# Patient Record
Sex: MALE | Race: WHITE | NOT HISPANIC OR LATINO | Employment: FULL TIME | URBAN - METROPOLITAN AREA
[De-identification: names, ages, dates, MRNs, and addresses within clinical notes are randomized per-mention and may not be internally consistent; named-entity substitution may affect disease eponyms.]

---

## 2017-01-16 ENCOUNTER — OFFICE VISIT (OUTPATIENT)
Dept: FAMILY MEDICINE CLINIC | Facility: CLINIC | Age: 52
End: 2017-01-16

## 2017-01-16 VITALS
OXYGEN SATURATION: 96 % | HEART RATE: 71 BPM | BODY MASS INDEX: 26.98 KG/M2 | HEIGHT: 75 IN | TEMPERATURE: 97.9 F | DIASTOLIC BLOOD PRESSURE: 82 MMHG | SYSTOLIC BLOOD PRESSURE: 130 MMHG | WEIGHT: 217 LBS

## 2017-01-16 DIAGNOSIS — R05.9 COUGH: ICD-10-CM

## 2017-01-16 DIAGNOSIS — J06.9 ACUTE URI: ICD-10-CM

## 2017-01-16 DIAGNOSIS — J40 BRONCHITIS: Primary | ICD-10-CM

## 2017-01-16 PROCEDURE — 71020 XR CHEST PA AND LATERAL: CPT | Performed by: FAMILY MEDICINE

## 2017-01-16 PROCEDURE — 99214 OFFICE O/P EST MOD 30 MIN: CPT | Performed by: FAMILY MEDICINE

## 2017-01-16 RX ORDER — LEVOFLOXACIN 500 MG/1
500 TABLET, FILM COATED ORAL DAILY
Qty: 10 TABLET | Refills: 0 | Status: SHIPPED | OUTPATIENT
Start: 2017-01-16 | End: 2017-01-26

## 2017-01-16 NOTE — PROGRESS NOTES
Subjective   Edin Flores is a 51 y.o. male.     History of Present Illness   Edin Flores 51 y.o. male who presents for evaluation of sinus pressure and congestion, cough. Symptoms include congestion and dry cough.  Onset of symptoms was 2 months ago, unchanged since that time. Patient denies hemoptysis, high fever.   Evaluation to date: seen recently by me Treatment to date:  OTC oral decongestants, Mucinex and antibiotics.          The following portions of the patient's history were reviewed and updated as appropriate: allergies, current medications, past family history, past medical history, past social history, past surgical history and problem list.    Review of Systems   Constitutional: Positive for fatigue. Negative for activity change and unexpected weight change.   HENT: Positive for congestion, postnasal drip, rhinorrhea and sinus pressure. Negative for ear pain.         Hoarseness   Eyes: Negative for pain and discharge.   Respiratory: Positive for cough. Negative for chest tightness, shortness of breath and wheezing.    Cardiovascular: Negative for chest pain and palpitations.   Gastrointestinal: Negative for abdominal pain, diarrhea and vomiting.   Endocrine: Negative.    Genitourinary: Negative.    Musculoskeletal: Negative for joint swelling.   Skin: Negative for color change, rash and wound.   Allergic/Immunologic: Negative.    Neurological: Negative for seizures and syncope.   Psychiatric/Behavioral: Negative.        Objective   Physical Exam   Constitutional: He is oriented to person, place, and time. He appears well-developed and well-nourished.   HENT:   Head: Normocephalic and atraumatic.   Right Ear: External ear normal.   Left Ear: External ear normal.   Mouth/Throat: Oropharynx is clear and moist.   Eyes: EOM are normal. Pupils are equal, round, and reactive to light.   Neck: Normal range of motion. Neck supple. No thyromegaly present.   Cardiovascular: Normal rate, regular rhythm,  normal heart sounds and intact distal pulses.    Pulmonary/Chest: Effort normal. No respiratory distress. He has wheezes. He has no rales.   Wheezes and rhonchi left base   Abdominal: Soft. Bowel sounds are normal.   Musculoskeletal: Normal range of motion.   Lymphadenopathy:     He has no cervical adenopathy.   Neurological: He is alert and oriented to person, place, and time. He has normal reflexes.   Skin: Skin is warm and dry.   Psychiatric: He has a normal mood and affect. His behavior is normal.   Nursing note and vitals reviewed.  Views: PA/Lateral    Relevant Clinical Issues/Diagnoses/Indications for XRay: see HPI  Clinical Findings: Chest: was negative for infiltrate, effusion, pneumothorax, or wide mediastinum    Compared with previous XRay? no    Date of Previous Xray:n/a    Changes on current Xray? not applicable    Assessment/Plan   Edin was seen today for sinusitis and cough.    Diagnoses and all orders for this visit:    Bronchitis  -     XR Chest PA & Lateral    Cough  -     XR Chest PA & Lateral    Acute URI  -     XR Chest PA & Lateral    Other orders  -     levoFLOXacin (LEVAQUIN) 500 MG tablet; Take 1 tablet by mouth Daily for 10 days. For Respiratory infection

## 2017-01-16 NOTE — MR AVS SNAPSHOT
Edin Flores   1/16/2017 11:30 AM   Office Visit    Provider:  Chao Morton MD   Department:  [facility]   Dept Phone:  [phone]                Your Full Care Plan              Today's Medication Changes          These changes are accurate as of: 1/16/17 12:50 PM.  If you have any questions, ask your nurse or doctor.               New Medication(s)Ordered:     levoFLOXacin 500 MG tablet   Commonly known as:  LEVAQUIN   Take 1 tablet by mouth Daily for 10 days. For Respiratory infection   Started by:  Chao Morton MD         Stop taking medication(s)listed here:     cefuroxime 250 MG tablet   Commonly known as:  CEFTIN   Stopped by:  Chao Morton MD                Where to Get Your Medications      These medications were sent to [address] - [address] [place] [address] [phone] [place] [id number] [address] [address]     Phone:  [phone]     levoFLOXacin 500 MG tablet                  Your Updated Medication List          This list is accurate as of: 1/16/17 12:50 PM.  Always use your most recent med list.                levoFLOXacin 500 MG tablet   Commonly known as:  LEVAQUIN   Take 1 tablet by mouth Daily for 10 days. For Respiratory infection               We Performed the Following     XR Chest PA & Lateral       You Were Diagnosed With        Codes Comments    Bronchitis    -  Primary ICD-10-CM: J40  ICD-9-CM: 490     Cough     ICD-10-CM: R05  ICD-9-CM: 786.2     Acute URI     ICD-10-CM: J06.9  ICD-9-CM: 465.9       Instructions    Patient advised to stop smoking  Rest  Increase fluids   RTC prn or 1 week if not better   Take mucinex DM  Take Zyrtec or Claritin   OTC cough med prn      Ibuprofen for pain and fever control                                                        Tylenol(acetominophen) for pain and fever control    Saline nasal spray 2 sprays to each nostril 6 times  "daily         Patient Instructions History      [employer] Signup     [facility] [employer] allows you to send messages to your doctor, view your test results, renew your prescriptions, schedule appointments, and more. To sign up, go to [employer] and click on the Sign Up Now link in the New User? box. Enter your [employer] Activation Code exactly as it appears below along with the last four digits of your Social Security Number and your Date of Birth () to complete the sign-up process. If you do not sign up before the expiration date, you must request a new code.    [employer] Activation Code: [email or web address]  Expires: 2017 12:50 PM    If you have questions, you can email [email or web address] or call [phone] to talk to our [employer] staff. Remember, [employer] is NOT to be used for urgent needs. For medical emergencies, dial 911.               Other Info from Your Visit           Allergies     No Known Allergies      Reason for Visit     Sinusitis     Cough           Vital Signs     Blood Pressure Pulse Temperature Height Weight Oxygen Saturation    130/82 71 97.9 °F (36.6 °C) 75\" (190.5 cm) 217 lb (98.4 kg) 96%    Body Mass Index Smoking Status                27.12 kg/m2 Never Smoker          Problems and Diagnoses Noted     Bronchitis    -  Primary    Cough        Acute upper respiratory infection          "

## 2017-01-16 NOTE — PATIENT INSTRUCTIONS
Patient advised to stop smoking  Rest  Increase fluids   RTC prn or 1 week if not better   Take mucinex DM  Take Zyrtec or Claritin   OTC cough med prn      Ibuprofen for pain and fever control                                                        Tylenol(acetominophen) for pain and fever control    Saline nasal spray 2 sprays to each nostril 6 times daily  mucinex dm

## 2017-02-08 RX ORDER — LEVOFLOXACIN 500 MG/1
500 TABLET, FILM COATED ORAL DAILY
Qty: 10 TABLET | Refills: 0 | Status: SHIPPED | OUTPATIENT
Start: 2017-02-08 | End: 2017-11-14

## 2017-07-03 ENCOUNTER — OFFICE VISIT (OUTPATIENT)
Dept: FAMILY MEDICINE CLINIC | Facility: CLINIC | Age: 52
End: 2017-07-03

## 2017-07-03 VITALS
DIASTOLIC BLOOD PRESSURE: 82 MMHG | OXYGEN SATURATION: 96 % | WEIGHT: 229 LBS | TEMPERATURE: 98.5 F | HEART RATE: 64 BPM | HEIGHT: 75 IN | SYSTOLIC BLOOD PRESSURE: 124 MMHG | BODY MASS INDEX: 28.47 KG/M2 | RESPIRATION RATE: 18 BRPM

## 2017-07-03 DIAGNOSIS — S76.219A GROIN STRAIN, UNSPECIFIED LATERALITY, INITIAL ENCOUNTER: Primary | ICD-10-CM

## 2017-07-03 PROCEDURE — 99213 OFFICE O/P EST LOW 20 MIN: CPT | Performed by: NURSE PRACTITIONER

## 2017-07-03 RX ORDER — NAPROXEN 500 MG/1
500 TABLET ORAL 2 TIMES DAILY WITH MEALS
Qty: 30 TABLET | Refills: 0 | Status: SHIPPED | OUTPATIENT
Start: 2017-07-03 | End: 2017-11-14

## 2017-07-03 NOTE — PROGRESS NOTES
Subjective   Edin Flores is a 52 y.o. male.     History of Present Illness   Patient complains of bilateral groin pain. The symptoms began a week ago.  Pain is a result of overuse at the gym. Pain is located bilateral groin region. Discomfort is described as soreness. Symptoms are exacerbated by walking, pressure applied to area and movement.  Evaluation to date: none. Therapy to date includes: rest, ice and avoidance of offending activity with some improvement. Patient denies bulge to the area.       The following portions of the patient's history were reviewed and updated as appropriate: allergies, current medications, past family history, past medical history, past social history, past surgical history and problem list.    Review of Systems   Genitourinary: Negative for difficulty urinating, penile pain, penile swelling, scrotal swelling, testicular pain and urgency.   Musculoskeletal: Negative for arthralgias, back pain, gait problem, joint swelling, myalgias, neck pain and neck stiffness.        Groin pain   Neurological: Positive for weakness. Negative for numbness.       Objective   Physical Exam   Constitutional: He is oriented to person, place, and time. He appears well-developed and well-nourished.   Pulmonary/Chest: Effort normal.   Musculoskeletal:   Tenderness to palpation of bilateral groin  And suprapubic area. No bulge or significant swelling noted. Patient denies pain with bilateral hip ROM.    Neurological: He is oriented to person, place, and time.   Skin: Skin is warm and dry.   Psychiatric: He has a normal mood and affect. His behavior is normal. Judgment and thought content normal.   Nursing note and vitals reviewed.      Assessment/Plan   Edin was seen today for groin pain.    Diagnoses and all orders for this visit:    Groin strain, unspecified laterality, initial encounter  -     naproxen (NAPROSYN) 500 MG tablet; Take 1 tablet by mouth 2 (Two) Times a Day With Meals.

## 2017-10-09 DIAGNOSIS — R53.83 OTHER FATIGUE: ICD-10-CM

## 2017-10-09 DIAGNOSIS — Z12.5 SCREENING FOR PROSTATE CANCER: Primary | ICD-10-CM

## 2017-10-09 DIAGNOSIS — Z00.00 GENERAL MEDICAL EXAM: ICD-10-CM

## 2017-11-02 LAB
ALBUMIN SERPL-MCNC: 4.9 G/DL (ref 3.5–5.2)
ALBUMIN/GLOB SERPL: 1.6 G/DL
ALP SERPL-CCNC: 52 U/L (ref 39–117)
ALT SERPL-CCNC: 35 U/L (ref 1–41)
APPEARANCE UR: CLEAR
AST SERPL-CCNC: 36 U/L (ref 1–40)
BACTERIA #/AREA URNS HPF: NORMAL /HPF
BASOPHILS # BLD AUTO: 0.04 10*3/MM3 (ref 0–0.2)
BASOPHILS NFR BLD AUTO: 0.7 % (ref 0–1.5)
BILIRUB SERPL-MCNC: 0.7 MG/DL (ref 0.1–1.2)
BILIRUB UR QL STRIP: NEGATIVE
BUN SERPL-MCNC: 19 MG/DL (ref 6–20)
BUN/CREAT SERPL: 15.2 (ref 7–25)
CALCIUM SERPL-MCNC: 10.2 MG/DL (ref 8.6–10.5)
CASTS URNS MICRO: NORMAL
CHLORIDE SERPL-SCNC: 101 MMOL/L (ref 98–107)
CHOLEST SERPL-MCNC: 165 MG/DL (ref 0–200)
CO2 SERPL-SCNC: 26.9 MMOL/L (ref 22–29)
COLOR UR: YELLOW
CREAT SERPL-MCNC: 1.25 MG/DL (ref 0.76–1.27)
EOSINOPHIL # BLD AUTO: 0.17 10*3/MM3 (ref 0–0.7)
EOSINOPHIL NFR BLD AUTO: 3 % (ref 0.3–6.2)
EPI CELLS #/AREA URNS HPF: NORMAL /HPF
ERYTHROCYTE [DISTWIDTH] IN BLOOD BY AUTOMATED COUNT: 12.9 % (ref 11.5–14.5)
GFR SERPLBLD CREATININE-BSD FMLA CKD-EPI: 61 ML/MIN/1.73
GFR SERPLBLD CREATININE-BSD FMLA CKD-EPI: 74 ML/MIN/1.73
GLOBULIN SER CALC-MCNC: 3 GM/DL
GLUCOSE SERPL-MCNC: 100 MG/DL (ref 65–99)
GLUCOSE UR QL: NEGATIVE
HCT VFR BLD AUTO: 46 % (ref 40.4–52.2)
HDLC SERPL-MCNC: 52 MG/DL (ref 40–60)
HGB BLD-MCNC: 15.3 G/DL (ref 13.7–17.6)
HGB UR QL STRIP: NEGATIVE
IMM GRANULOCYTES # BLD: 0 10*3/MM3 (ref 0–0.03)
IMM GRANULOCYTES NFR BLD: 0 % (ref 0–0.5)
KETONES UR QL STRIP: NEGATIVE
LDLC SERPL CALC-MCNC: 88 MG/DL (ref 0–100)
LEUKOCYTE ESTERASE UR QL STRIP: NEGATIVE
LYMPHOCYTES # BLD AUTO: 2.47 10*3/MM3 (ref 0.9–4.8)
LYMPHOCYTES NFR BLD AUTO: 43.7 % (ref 19.6–45.3)
MCH RBC QN AUTO: 29.9 PG (ref 27–32.7)
MCHC RBC AUTO-ENTMCNC: 33.3 G/DL (ref 32.6–36.4)
MCV RBC AUTO: 90 FL (ref 79.8–96.2)
MONOCYTES # BLD AUTO: 0.47 10*3/MM3 (ref 0.2–1.2)
MONOCYTES NFR BLD AUTO: 8.3 % (ref 5–12)
NEUTROPHILS # BLD AUTO: 2.5 10*3/MM3 (ref 1.9–8.1)
NEUTROPHILS NFR BLD AUTO: 44.3 % (ref 42.7–76)
NITRITE UR QL STRIP: NEGATIVE
PH UR STRIP: 7 [PH] (ref 5–8)
PLATELET # BLD AUTO: 220 10*3/MM3 (ref 140–500)
POTASSIUM SERPL-SCNC: 4.6 MMOL/L (ref 3.5–5.2)
PROT SERPL-MCNC: 7.9 G/DL (ref 6–8.5)
PROT UR QL STRIP: NEGATIVE
PSA SERPL-MCNC: 0.99 NG/ML (ref 0–4)
RBC # BLD AUTO: 5.11 10*6/MM3 (ref 4.6–6)
RBC #/AREA URNS HPF: NORMAL /HPF
SODIUM SERPL-SCNC: 142 MMOL/L (ref 136–145)
SP GR UR: 1.02 (ref 1–1.03)
TESTOST FREE SERPL-MCNC: 7.8 PG/ML (ref 7.2–24)
TESTOST SERPL-MCNC: 470 NG/DL (ref 264–916)
TRIGL SERPL-MCNC: 123 MG/DL (ref 0–150)
TSH SERPL DL<=0.005 MIU/L-ACNC: 2.36 MIU/ML (ref 0.27–4.2)
UROBILINOGEN UR STRIP-MCNC: (no result) MG/DL
VLDLC SERPL CALC-MCNC: 24.6 MG/DL (ref 5–40)
WBC # BLD AUTO: 5.65 10*3/MM3 (ref 4.5–10.7)
WBC #/AREA URNS HPF: NORMAL /HPF

## 2017-11-14 ENCOUNTER — OFFICE VISIT (OUTPATIENT)
Dept: FAMILY MEDICINE CLINIC | Facility: CLINIC | Age: 52
End: 2017-11-14

## 2017-11-14 VITALS
WEIGHT: 232 LBS | OXYGEN SATURATION: 99 % | BODY MASS INDEX: 28.85 KG/M2 | RESPIRATION RATE: 16 BRPM | TEMPERATURE: 97.8 F | DIASTOLIC BLOOD PRESSURE: 78 MMHG | HEIGHT: 75 IN | SYSTOLIC BLOOD PRESSURE: 116 MMHG | HEART RATE: 72 BPM

## 2017-11-14 DIAGNOSIS — Z00.00 ANNUAL PHYSICAL EXAM: Primary | ICD-10-CM

## 2017-11-14 DIAGNOSIS — K21.9 GASTROESOPHAGEAL REFLUX DISEASE, ESOPHAGITIS PRESENCE NOT SPECIFIED: ICD-10-CM

## 2017-11-14 DIAGNOSIS — Z12.11 ENCOUNTER FOR SCREENING COLONOSCOPY: ICD-10-CM

## 2017-11-14 PROCEDURE — 99396 PREV VISIT EST AGE 40-64: CPT | Performed by: FAMILY MEDICINE

## 2017-11-14 NOTE — PROGRESS NOTES
"Subjective   Edin Flores is a 52 y.o. male.     History of Present Illness   Chief Complaint:   Chief Complaint   Patient presents with   • Annual Exam       Edin Flores 52 y.o. male who presents today for his annual physical exam. I reviewed his lab results. His last CXR was 01/16/2017. Patient declined an EKG in the office today. Had normal ekg 2 months ago. See labs.  See bp.  he has a problem list of he has to go back to urinate  At  Times   Drinks lot of water  Patient Active Problem List   Diagnosis   • Gastroesophageal reflux disease without esophagitis   .  Since the last visit, he has overall felt well.  he has been compliant with   Current Outpatient Prescriptions:   •  esomeprazole (nexIUM) 20 MG capsule, Take 20 mg by mouth Every Morning Before Breakfast., Disp: , Rfl: .  he denies medication side effects.    All of the chronic condition(s) listed above are stable w/o issues.    /78  Pulse 72  Temp 97.8 °F (36.6 °C) (Oral)   Resp 16  Ht 75\" (190.5 cm)  Wt 232 lb (105 kg)  SpO2 99%  BMI 29 kg/m2    Results for orders placed or performed in visit on 10/09/17   Comprehensive metabolic panel   Result Value Ref Range    Glucose 100 (H) 65 - 99 mg/dL    BUN 19 6 - 20 mg/dL    Creatinine 1.25 0.76 - 1.27 mg/dL    eGFR Non African Am 61 >60 mL/min/1.73    eGFR African Am 74 >60 mL/min/1.73    BUN/Creatinine Ratio 15.2 7.0 - 25.0    Sodium 142 136 - 145 mmol/L    Potassium 4.6 3.5 - 5.2 mmol/L    Chloride 101 98 - 107 mmol/L    Total CO2 26.9 22.0 - 29.0 mmol/L    Calcium 10.2 8.6 - 10.5 mg/dL    Total Protein 7.9 6.0 - 8.5 g/dL    Albumin 4.90 3.50 - 5.20 g/dL    Globulin 3.0 gm/dL    A/G Ratio 1.6 g/dL    Total Bilirubin 0.7 0.1 - 1.2 mg/dL    Alkaline Phosphatase 52 39 - 117 U/L    AST (SGOT) 36 1 - 40 U/L    ALT (SGPT) 35 1 - 41 U/L   Lipid panel   Result Value Ref Range    Total Cholesterol 165 0 - 200 mg/dL    Triglycerides 123 0 - 150 mg/dL    HDL Cholesterol 52 40 - 60 mg/dL    VLDL " Cholesterol 24.6 5 - 40 mg/dL    LDL Cholesterol  88 0 - 100 mg/dL   TSH   Result Value Ref Range    TSH 2.360 0.270 - 4.200 mIU/mL   PSA   Result Value Ref Range    PSA 0.989 0.000 - 4.000 ng/mL   Testosterone, Free, Total   Result Value Ref Range    Testosterone, Total 470 264 - 916 ng/dL    Testosterone, Free 7.8 7.2 - 24.0 pg/mL   Microscopic Examination   Result Value Ref Range    WBC, UA 0-2 /hpf    RBC, UA 0-2 /hpf    Epithelial Cells (non renal) 0-2 /hpf    Cast Type Comment     Bacteria, UA Comment None Seen /hpf   CBC and Differential   Result Value Ref Range    WBC 5.65 4.50 - 10.70 10*3/mm3    RBC 5.11 4.60 - 6.00 10*6/mm3    Hemoglobin 15.3 13.7 - 17.6 g/dL    Hematocrit 46.0 40.4 - 52.2 %    MCV 90.0 79.8 - 96.2 fL    MCH 29.9 27.0 - 32.7 pg    MCHC 33.3 32.6 - 36.4 g/dL    RDW 12.9 11.5 - 14.5 %    Platelets 220 140 - 500 10*3/mm3    Neutrophil Rel % 44.3 42.7 - 76.0 %    Lymphocyte Rel % 43.7 19.6 - 45.3 %    Monocyte Rel % 8.3 5.0 - 12.0 %    Eosinophil Rel % 3.0 0.3 - 6.2 %    Basophil Rel % 0.7 0.0 - 1.5 %    Neutrophils Absolute 2.50 1.90 - 8.10 10*3/mm3    Lymphocytes Absolute 2.47 0.90 - 4.80 10*3/mm3    Monocytes Absolute 0.47 0.20 - 1.20 10*3/mm3    Eosinophils Absolute 0.17 0.00 - 0.70 10*3/mm3    Basophils Absolute 0.04 0.00 - 0.20 10*3/mm3    Immature Granulocyte Rel % 0.0 0.0 - 0.5 %    Immature Grans Absolute 0.00 0.00 - 0.03 10*3/mm3   Urinalysis With Microscopic - Urine, Clean Catch   Result Value Ref Range    Specific Gravity, UA 1.021 1.005 - 1.030    pH, UA 7.0 5.0 - 8.0    Color, UA Yellow     Appearance, UA Clear Clear    Leukocytes, UA Negative Negative    Protein Negative Negative    Glucose, UA Negative Negative    Ketones Negative Negative    Blood, UA Negative Negative    Bilirubin, UA Negative Negative    Urobilinogen, UA Comment     Nitrite, UA Negative Negative           The following portions of the patient's history were reviewed and updated as appropriate: allergies,  current medications, past family history, past medical history, past social history, past surgical history and problem list.    Review of Systems   Constitutional: Negative for activity change, appetite change and unexpected weight change.   Eyes: Negative for visual disturbance.   Respiratory: Negative for chest tightness and shortness of breath.    Cardiovascular: Negative for chest pain and palpitations.   Genitourinary: Positive for difficulty urinating.   Skin: Negative for color change.   Neurological: Negative for syncope and speech difficulty.   Psychiatric/Behavioral: Negative for confusion and decreased concentration.       Objective   Physical Exam   Constitutional: He is oriented to person, place, and time. He appears well-developed and well-nourished.   HENT:   Head: Normocephalic and atraumatic.   Right Ear: External ear normal.   Left Ear: External ear normal.   Nose: Nose normal.   Mouth/Throat: Oropharynx is clear and moist.   Eyes: Conjunctivae and EOM are normal. Pupils are equal, round, and reactive to light.   Neck: Normal range of motion. Neck supple. No thyromegaly present.   Cardiovascular: Normal rate, regular rhythm and normal heart sounds.    Pulmonary/Chest: Effort normal and breath sounds normal. He has no wheezes.   Abdominal: Soft. Bowel sounds are normal. He exhibits no distension. There is no tenderness.   Genitourinary:   Genitourinary Comments: Deferred per patient   Musculoskeletal: Normal range of motion. He exhibits no edema or tenderness.   Lymphadenopathy:     He has no cervical adenopathy.   Neurological: He is alert and oriented to person, place, and time. He has normal reflexes.   Skin: Skin is warm and dry. No rash noted.   Psychiatric: He has a normal mood and affect. His behavior is normal. Judgment and thought content normal.   Nursing note and vitals reviewed.      Assessment/Plan   Edin was seen today for annual exam.    Diagnoses and all orders for this  No visit:    Annual physical exam  -     Ambulatory Referral to Gastroenterology    Gastroesophageal reflux disease, esophagitis presence not specified  -     Ambulatory Referral to Gastroenterology    Encounter for screening colonoscopy  -     Ambulatory Referral to Gastroenterology

## 2017-12-27 ENCOUNTER — OFFICE VISIT (OUTPATIENT)
Dept: FAMILY MEDICINE CLINIC | Facility: CLINIC | Age: 52
End: 2017-12-27

## 2017-12-27 VITALS
SYSTOLIC BLOOD PRESSURE: 123 MMHG | OXYGEN SATURATION: 94 % | HEIGHT: 75 IN | RESPIRATION RATE: 16 BRPM | DIASTOLIC BLOOD PRESSURE: 78 MMHG | HEART RATE: 67 BPM | BODY MASS INDEX: 28.72 KG/M2 | TEMPERATURE: 98.3 F | WEIGHT: 231 LBS

## 2017-12-27 DIAGNOSIS — J32.0 MAXILLARY SINUSITIS, UNSPECIFIED CHRONICITY: Primary | ICD-10-CM

## 2017-12-27 PROCEDURE — 99213 OFFICE O/P EST LOW 20 MIN: CPT | Performed by: FAMILY MEDICINE

## 2017-12-27 RX ORDER — LEVOFLOXACIN 500 MG/1
500 TABLET, FILM COATED ORAL DAILY
Qty: 10 TABLET | Refills: 0 | Status: SHIPPED | OUTPATIENT
Start: 2017-12-27 | End: 2018-01-06

## 2017-12-27 NOTE — PATIENT INSTRUCTIONS
Rest  Increase fluids   RTC prn or 1 week if not better   Take mucinex DM  Take Zyrtec or Claritin   OTC cough med prn      Ibuprofen for pain and fever control                                                        Tylenol(acetominophen) for pain and fever control    Saline nasal spray 2 sprays to each nostril 6 times daily    mucinex dm, allegra

## 2017-12-27 NOTE — PROGRESS NOTES
"Subjective   Edin Flores is a 52 y.o. male.     History of Present Illness   Chief Complaint:   Chief Complaint   Patient presents with   • Sinusitis       Edin Flores 52 y.o. male who presents for evaluation of sinus pressure and congestion. Symptoms include congestion, dry cough and productive cough.  Onset of symptoms was 5 days ago, gradually improving since that time. Patient denies shortness of breath.   Evaluation to date: none Treatment to date:  OTC antihistamines and Mucinex.       he has a problem list of   Patient Active Problem List   Diagnosis   • Gastroesophageal reflux disease without esophagitis   .  Since the last visit, he has overall felt well.  he has been compliant with   Current Outpatient Prescriptions:   •  esomeprazole (nexIUM) 20 MG capsule, Take 20 mg by mouth Every Morning Before Breakfast., Disp: , Rfl: .  he denies medication side effects.    All of the chronic condition(s) listed above are stable w/o issues.    /78  Pulse 67  Temp 98.3 °F (36.8 °C) (Oral)   Resp 16  Ht 190.5 cm (75\")  Wt 105 kg (231 lb)  SpO2 94%  BMI 28.87 kg/m2    The following portions of the patient's history were reviewed and updated as appropriate: allergies, current medications, past family history, past medical history, past social history, past surgical history and problem list.    Review of Systems   Constitutional: Negative for activity change, appetite change and unexpected weight change.   HENT: Positive for congestion and sinus pressure.    Eyes: Negative for visual disturbance.   Respiratory: Positive for cough. Negative for chest tightness and shortness of breath.    Cardiovascular: Negative for chest pain and palpitations.   Skin: Negative for color change.   Neurological: Negative for syncope and speech difficulty.   Psychiatric/Behavioral: Negative for confusion and decreased concentration.       Objective   Physical Exam   Constitutional: He is oriented to person, place, and " time. He appears well-developed and well-nourished. No distress.   HENT:   Head: Normocephalic and atraumatic.   Right Ear: External ear normal.   Left Ear: External ear normal.   Nose: Nose normal.   Mouth/Throat: Oropharynx is clear and moist.   Tender maxillary sinus   Eyes: Conjunctivae and EOM are normal. Pupils are equal, round, and reactive to light.   Neck: Normal range of motion. No tracheal deviation present. No thyromegaly present.   Cardiovascular: Normal rate and regular rhythm.    Pulmonary/Chest: Effort normal and breath sounds normal. He has no rales.   Abdominal: Soft.   Lymphadenopathy:     He has no cervical adenopathy.   Neurological: He is alert and oriented to person, place, and time.   Psychiatric: He has a normal mood and affect. His behavior is normal.       Assessment/Plan   Edin was seen today for sinusitis.    Diagnoses and all orders for this visit:    Maxillary sinusitis, unspecified chronicity    Other orders  -     levoFLOXacin (LEVAQUIN) 500 MG tablet; Take 1 tablet by mouth Daily for 10 days. For Respiratory infection

## 2018-10-12 ENCOUNTER — OFFICE VISIT (OUTPATIENT)
Dept: FAMILY MEDICINE CLINIC | Facility: CLINIC | Age: 53
End: 2018-10-12

## 2018-10-12 VITALS
OXYGEN SATURATION: 98 % | HEIGHT: 75 IN | DIASTOLIC BLOOD PRESSURE: 86 MMHG | HEART RATE: 58 BPM | TEMPERATURE: 97.7 F | SYSTOLIC BLOOD PRESSURE: 120 MMHG | WEIGHT: 233 LBS | BODY MASS INDEX: 28.97 KG/M2 | RESPIRATION RATE: 16 BRPM

## 2018-10-12 DIAGNOSIS — K21.9 GASTROESOPHAGEAL REFLUX DISEASE WITHOUT ESOPHAGITIS: Primary | ICD-10-CM

## 2018-10-12 PROCEDURE — 99213 OFFICE O/P EST LOW 20 MIN: CPT | Performed by: FAMILY MEDICINE

## 2018-10-12 RX ORDER — LANSOPRAZOLE 15 MG/1
15 CAPSULE, DELAYED RELEASE ORAL DAILY
COMMUNITY
End: 2019-01-11

## 2018-10-12 NOTE — PROGRESS NOTES
"Subjective   Chief Complaint:   Chief Complaint   Patient presents with   • Heartburn   gerd      History of Present Illness history of hiatus hernia with GERD.  He's been scoped in the past.  He was taking Prevacid over-the-counter 15 mg and taking 2 of them daily and that was working well.  Rarely Prevacid 15 mg as been recalled.  I Fuentes give him a prescription for Prevacid 30 mg 1 a day #90.  He has seen [facility] before and I'm going to refer him back to [facility] he needs a colonoscopy and then he can set it up and maybe get a upper endoscopy at the same time.  He is going to call me back if he can not  get the Prevacid.  He watches his diet closely.  bp 120/80  I gave him Prevacid 30 mg #30 with 5 refills one a day.            Edin Flores 53 y.o. male who presents today for Medical Management of the below listed issues and medication refills.       he has a problem list of   Patient Active Problem List   Diagnosis   • Gastroesophageal reflux disease without esophagitis   .  Since the last visit, he has overall felt well.  he has been compliant with   Current Outpatient Prescriptions:   •  lansoprazole (PREVACID) 15 MG capsule, Take 15 mg by mouth Daily., Disp: , Rfl: .  he denies medication side effects.    All of the chronic condition(s) listed above are stable w/o issues.    /86   Pulse 58   Temp 97.7 °F (36.5 °C)   Resp 16   Ht 190.5 cm (75\")   Wt 106 kg (233 lb)   SpO2 98%   BMI 29.12 kg/m²     Results for orders placed or performed in visit on 10/09/17   Comprehensive metabolic panel   Result Value Ref Range    Glucose 100 (H) 65 - 99 mg/dL    BUN 19 6 - 20 mg/dL    Creatinine 1.25 0.76 - 1.27 mg/dL    eGFR Non African Am 61 >60 mL/min/1.73    eGFR African Am 74 >60 mL/min/1.73    BUN/Creatinine Ratio 15.2 7.0 - 25.0    Sodium 142 136 - 145 mmol/L    Potassium 4.6 3.5 - 5.2 mmol/L    Chloride 101 98 - 107 mmol/L    Total CO2 26.9 22.0 - 29.0 mmol/L    Calcium 10.2 8.6 - 10.5 mg/dL    Total Protein " 7.9 6.0 - 8.5 g/dL    Albumin 4.90 3.50 - 5.20 g/dL    Globulin 3.0 gm/dL    A/G Ratio 1.6 g/dL    Total Bilirubin 0.7 0.1 - 1.2 mg/dL    Alkaline Phosphatase 52 39 - 117 U/L    AST (SGOT) 36 1 - 40 U/L    ALT (SGPT) 35 1 - 41 U/L   Lipid panel   Result Value Ref Range    Total Cholesterol 165 0 - 200 mg/dL    Triglycerides 123 0 - 150 mg/dL    HDL Cholesterol 52 40 - 60 mg/dL    VLDL Cholesterol 24.6 5 - 40 mg/dL    LDL Cholesterol  88 0 - 100 mg/dL   TSH   Result Value Ref Range    TSH 2.360 0.270 - 4.200 mIU/mL   PSA   Result Value Ref Range    PSA 0.989 0.000 - 4.000 ng/mL   Testosterone, Free, Total   Result Value Ref Range    Testosterone, Total 470 264 - 916 ng/dL    Testosterone, Free 7.8 7.2 - 24.0 pg/mL   Microscopic Examination   Result Value Ref Range    WBC, UA 0-2 /hpf    RBC, UA 0-2 /hpf    Epithelial Cells (non renal) 0-2 /hpf    Cast Type Comment     Bacteria, UA Comment None Seen /hpf   CBC and Differential   Result Value Ref Range    WBC 5.65 4.50 - 10.70 10*3/mm3    RBC 5.11 4.60 - 6.00 10*6/mm3    Hemoglobin 15.3 13.7 - 17.6 g/dL    Hematocrit 46.0 40.4 - 52.2 %    MCV 90.0 79.8 - 96.2 fL    MCH 29.9 27.0 - 32.7 pg    MCHC 33.3 32.6 - 36.4 g/dL    RDW 12.9 11.5 - 14.5 %    Platelets 220 140 - 500 10*3/mm3    Neutrophil Rel % 44.3 42.7 - 76.0 %    Lymphocyte Rel % 43.7 19.6 - 45.3 %    Monocyte Rel % 8.3 5.0 - 12.0 %    Eosinophil Rel % 3.0 0.3 - 6.2 %    Basophil Rel % 0.7 0.0 - 1.5 %    Neutrophils Absolute 2.50 1.90 - 8.10 10*3/mm3    Lymphocytes Absolute 2.47 0.90 - 4.80 10*3/mm3    Monocytes Absolute 0.47 0.20 - 1.20 10*3/mm3    Eosinophils Absolute 0.17 0.00 - 0.70 10*3/mm3    Basophils Absolute 0.04 0.00 - 0.20 10*3/mm3    Immature Granulocyte Rel % 0.0 0.0 - 0.5 %    Immature Grans Absolute 0.00 0.00 - 0.03 10*3/mm3   Urinalysis With Microscopic - Urine, Clean Catch   Result Value Ref Range    Specific Gravity, UA 1.021 1.005 - 1.030    pH, UA 7.0 5.0 - 8.0    Color, UA Yellow      Appearance, UA Clear Clear    Leukocytes, UA Negative Negative    Protein Negative Negative    Glucose, UA Negative Negative    Ketones Negative Negative    Blood, UA Negative Negative    Bilirubin, UA Negative Negative    Urobilinogen, UA Comment     Nitrite, UA Negative Negative             The following portions of the patient's history were reviewed and updated as appropriate: allergies, current medications, past family history, past medical history, past social history, past surgical history and problem list.    Review of Systems   Constitutional: Negative for activity change and chills.   HENT: Negative for sinus pain and sinus pressure.    Respiratory: Negative for chest tightness.    Cardiovascular: Negative for chest pain.   Gastrointestinal: Negative for abdominal distention, abdominal pain, anal bleeding and constipation.       Objective   Physical Exam   Constitutional: He is oriented to person, place, and time. He appears well-developed and well-nourished.   HENT:   Head: Atraumatic.   Mouth/Throat: Oropharynx is clear and moist.   Eyes: Pupils are equal, round, and reactive to light. EOM are normal.   Neck: Normal range of motion. Neck supple. No thyromegaly present.   Cardiovascular: Normal rate and regular rhythm.    Pulmonary/Chest: Effort normal and breath sounds normal.   Abdominal: Soft.   Musculoskeletal: Normal range of motion.   Neurological: He is alert and oriented to person, place, and time.   Skin: Skin is warm and dry.   Psychiatric: He has a normal mood and affect. His behavior is normal.   Nursing note and vitals reviewed.      Assessment/Plan   Edin was seen today for heartburn.    Diagnoses and all orders for this visit:    Gastroesophageal reflux disease without esophagitis

## 2018-10-12 NOTE — PATIENT INSTRUCTIONS
Exercise 30 minutes most days of the week  Sleep 6-8 hours each night if possible  Low fat, low cholesterol diet   we discussed prescribed medications and how to take them   make sure you get results of any labs/studies ordered today  Low glycemic index diet   Gave him a prescription for Prevacid 30 mg 1 a day #30 with 5 refills M in referring to [facility] for colonoscopy and to see about GERD

## 2018-10-18 ENCOUNTER — TELEPHONE (OUTPATIENT)
Dept: FAMILY MEDICINE CLINIC | Facility: CLINIC | Age: 53
End: 2018-10-18

## 2018-10-22 ENCOUNTER — OFFICE VISIT (OUTPATIENT)
Dept: FAMILY MEDICINE CLINIC | Facility: CLINIC | Age: 53
End: 2018-10-22

## 2018-10-22 VITALS
TEMPERATURE: 97.8 F | BODY MASS INDEX: 29.09 KG/M2 | HEART RATE: 62 BPM | SYSTOLIC BLOOD PRESSURE: 137 MMHG | RESPIRATION RATE: 18 BRPM | WEIGHT: 234 LBS | OXYGEN SATURATION: 97 % | HEIGHT: 75 IN | DIASTOLIC BLOOD PRESSURE: 78 MMHG

## 2018-10-22 DIAGNOSIS — J01.90 ACUTE SINUSITIS, RECURRENCE NOT SPECIFIED, UNSPECIFIED LOCATION: Primary | ICD-10-CM

## 2018-10-22 PROCEDURE — 99213 OFFICE O/P EST LOW 20 MIN: CPT | Performed by: NURSE PRACTITIONER

## 2018-10-22 RX ORDER — LEVOFLOXACIN 500 MG/1
500 TABLET, FILM COATED ORAL DAILY
Qty: 10 TABLET | Refills: 0 | Status: SHIPPED | OUTPATIENT
Start: 2018-10-22 | End: 2018-11-20

## 2018-10-22 NOTE — PROGRESS NOTES
Subjective   Edin Flores is a 53 y.o. male.     History of Present Illness   Edin Flores 53 y.o. male who presents for evaluation of sinus pressure and congestion, sore throat. Symptoms include ear pressure, congestion, nasal blockage, post nasal drip, dry cough, sinus pain and fatigue.  Onset of symptoms was 3 weeks ago, unchanged since that time. Patient denies shortness of breath, wheezing, fever.   Evaluation to date: none Treatment to date:  OTC oral decongestants, OTC antihistamines and OTC cough suppressant, mucinex.    The following portions of the patient's history were reviewed and updated as appropriate: allergies, current medications, past family history, past medical history, past social history, past surgical history and problem list.    Review of Systems   Constitutional: Positive for fatigue. Negative for chills and fever.   HENT: Positive for congestion, ear pain, postnasal drip, sinus pain, sinus pressure and sore throat. Negative for ear discharge and rhinorrhea.    Respiratory: Positive for cough. Negative for chest tightness, shortness of breath and wheezing.        Objective   Physical Exam   Constitutional: He is oriented to person, place, and time. He appears well-developed and well-nourished.   HENT:   Right Ear: Tympanic membrane, external ear and ear canal normal.   Left Ear: Tympanic membrane, external ear and ear canal normal.   Nose: Right sinus exhibits frontal sinus tenderness. Right sinus exhibits no maxillary sinus tenderness. Left sinus exhibits frontal sinus tenderness. Left sinus exhibits no maxillary sinus tenderness.   Mouth/Throat: Uvula is midline and oropharynx is clear and moist.   Cardiovascular: Normal rate and regular rhythm.    Pulmonary/Chest: Effort normal and breath sounds normal.   Neurological: He is oriented to person, place, and time.   Skin: Skin is warm and dry.   Psychiatric: He has a normal mood and affect. His behavior is normal. Judgment and thought  content normal.   Nursing note and vitals reviewed.      Assessment/Plan   Edin was seen today for sinusitis.    Diagnoses and all orders for this visit:    Acute sinusitis, recurrence not specified, unspecified location  -     levoFLOXacin (LEVAQUIN) 500 MG tablet; Take 1 tablet by mouth Daily.

## 2018-11-19 ENCOUNTER — OFFICE (OUTPATIENT)
Dept: URBAN - METROPOLITAN AREA CLINIC 2 | Facility: CLINIC | Age: 53
End: 2018-11-19

## 2018-11-19 VITALS
HEIGHT: 75 IN | HEART RATE: 58 BPM | DIASTOLIC BLOOD PRESSURE: 78 MMHG | SYSTOLIC BLOOD PRESSURE: 132 MMHG | WEIGHT: 227 LBS

## 2018-11-19 DIAGNOSIS — K21.9 GASTRO-ESOPHAGEAL REFLUX DISEASE WITHOUT ESOPHAGITIS: ICD-10-CM

## 2018-11-19 DIAGNOSIS — R10.13 EPIGASTRIC PAIN: ICD-10-CM

## 2018-11-19 DIAGNOSIS — Z12.11 ENCOUNTER FOR SCREENING FOR MALIGNANT NEOPLASM OF COLON: ICD-10-CM

## 2018-11-19 DIAGNOSIS — R07.89 OTHER CHEST PAIN: ICD-10-CM

## 2018-11-19 DIAGNOSIS — K30 FUNCTIONAL DYSPEPSIA: ICD-10-CM

## 2018-11-19 PROCEDURE — 99244 OFF/OP CNSLTJ NEW/EST MOD 40: CPT | Performed by: INTERNAL MEDICINE

## 2018-11-19 NOTE — SERVICENOTES
laboratories and records reviewed.  His most recent lab work is from October of last year.  At that time CBC, CMP lipid panel and urinalysis were within normal limits.  TSH 2.36.  He is due for a physical and updated blood work with Dr. Amin.

## 2018-11-19 NOTE — SERVICEHPINOTES
Thank you very much for referring Mr. Mckee for evaluation. As you know he is a very pleasant healthy, gentleman who does have a long-standing history of reflux disease. He had been doing well on Prevacid over-the-counter but he says it was changed to generic at his local grocery, he started using the generic and he started having breakthrough symptoms. He went to another store and did find name brand Prevacid but he says it is not working as well as it used to. He is having reflux in spite of taking over-the-counter Prevacid. He's been having regurgitation he's had burning in the chest and noncardiac chest pain. He's had gas, bloating and dyspeptic symptoms. There is no dysphagia, odynophagia nausea or vomiting. He is not a smoker or drinker. He is not overweight he tries to watch his diet. There is no melena or hematemesis. He does tell me that sometimes the reflux seems to "affect my breathing".He has no lower GI complaints. There is no diarrhea, constipation or blood in the stool. He used to see my former partner Dr. Coyne, he says Dr. Coyne did a colonoscopy on a but there is nothing in his old records for 10 years so he does state a screening exam. He doesn't know anything about his family history. He has no lower abdominal pain or weight loss. He is in no distress. He does not look acutely ill.

## 2018-11-20 ENCOUNTER — OFFICE VISIT (OUTPATIENT)
Dept: FAMILY MEDICINE CLINIC | Facility: CLINIC | Age: 53
End: 2018-11-20

## 2018-11-20 VITALS
HEIGHT: 75 IN | HEART RATE: 62 BPM | OXYGEN SATURATION: 99 % | DIASTOLIC BLOOD PRESSURE: 92 MMHG | WEIGHT: 227 LBS | BODY MASS INDEX: 28.23 KG/M2 | RESPIRATION RATE: 16 BRPM | SYSTOLIC BLOOD PRESSURE: 143 MMHG | TEMPERATURE: 98.2 F

## 2018-11-20 DIAGNOSIS — G44.209 TENSION-TYPE HEADACHE, NOT INTRACTABLE, UNSPECIFIED CHRONICITY PATTERN: Primary | ICD-10-CM

## 2018-11-20 DIAGNOSIS — F43.9 STRESS: ICD-10-CM

## 2018-11-20 PROCEDURE — 99214 OFFICE O/P EST MOD 30 MIN: CPT | Performed by: FAMILY MEDICINE

## 2018-11-20 RX ORDER — BUSPIRONE HYDROCHLORIDE 5 MG/1
5 TABLET ORAL 3 TIMES DAILY
Qty: 30 TABLET | Refills: 0 | Status: SHIPPED | OUTPATIENT
Start: 2018-11-20 | End: 2020-08-25 | Stop reason: SDUPTHER

## 2018-11-20 NOTE — PROGRESS NOTES
"Subjective   Chief Complaint:   Chief Complaint   Patient presents with   • Fatigue   • Sinus Pressure         History of Present Illness office today with complaints of possibly a sinus infection.  He saw the nurse practitioner and got some Levaquin but doesn't feel any better.  He looks like he is under stress.  I took his blood pressure I got 130/84 and 130/90.  He feels like he has a sinus infection but it's not obviously has a sinus infection to me.  He looks like he is under stress going order labs on him bring him back Monday.  I given BuSpar 5 mg take 1 twice a day to give him 30 of them to see if that makes him feel better.  He said he is not sleeping well at night.  Not have fever or chills at home.  He's had shakes at home he says.  He get a new blood pressure cuff and take his blood pressure home and write those down on the paper and bring back to me on Monday.  HEENT call me back and he has any problems before Monday.  bp  130/90   130/84            Edin Flores 53 y.o. male who presents today for Medical Management of the below listed issues and medication refills.    ICD-10-CM ICD-9-CM   1. Tension-type headache, not intractable, unspecified chronicity pattern G44.209 339.10   2. Stress F43.9 V62.89        he has a problem list of   Patient Active Problem List   Diagnosis   • Gastroesophageal reflux disease without esophagitis   • Stress   • Tension-type headache, not intractable   .  Since the last visit, he has overall felt well.  he has been compliant with   Current Outpatient Medications:   •  lansoprazole (PREVACID) 15 MG capsule, Take 15 mg by mouth Daily., Disp: , Rfl: .  he denies medication side effects.    All of the chronic condition(s) listed above are stable w/o issues.    /92   Pulse 62   Temp 98.2 °F (36.8 °C)   Resp 16   Ht 190.5 cm (75\")   Wt 103 kg (227 lb)   SpO2 99%   BMI 28.37 kg/m²     Results for orders placed or performed in visit on 10/09/17   Comprehensive " metabolic panel   Result Value Ref Range    Glucose 100 (H) 65 - 99 mg/dL    BUN 19 6 - 20 mg/dL    Creatinine 1.25 0.76 - 1.27 mg/dL    eGFR Non African Am 61 >60 mL/min/1.73    eGFR African Am 74 >60 mL/min/1.73    BUN/Creatinine Ratio 15.2 7.0 - 25.0    Sodium 142 136 - 145 mmol/L    Potassium 4.6 3.5 - 5.2 mmol/L    Chloride 101 98 - 107 mmol/L    Total CO2 26.9 22.0 - 29.0 mmol/L    Calcium 10.2 8.6 - 10.5 mg/dL    Total Protein 7.9 6.0 - 8.5 g/dL    Albumin 4.90 3.50 - 5.20 g/dL    Globulin 3.0 gm/dL    A/G Ratio 1.6 g/dL    Total Bilirubin 0.7 0.1 - 1.2 mg/dL    Alkaline Phosphatase 52 39 - 117 U/L    AST (SGOT) 36 1 - 40 U/L    ALT (SGPT) 35 1 - 41 U/L   Lipid panel   Result Value Ref Range    Total Cholesterol 165 0 - 200 mg/dL    Triglycerides 123 0 - 150 mg/dL    HDL Cholesterol 52 40 - 60 mg/dL    VLDL Cholesterol 24.6 5 - 40 mg/dL    LDL Cholesterol  88 0 - 100 mg/dL   TSH   Result Value Ref Range    TSH 2.360 0.270 - 4.200 mIU/mL   PSA   Result Value Ref Range    PSA 0.989 0.000 - 4.000 ng/mL   Testosterone, Free, Total   Result Value Ref Range    Testosterone, Total 470 264 - 916 ng/dL    Testosterone, Free 7.8 7.2 - 24.0 pg/mL   Microscopic Examination   Result Value Ref Range    WBC, UA 0-2 /hpf    RBC, UA 0-2 /hpf    Epithelial Cells (non renal) 0-2 /hpf    Cast Type Comment     Bacteria, UA Comment None Seen /hpf   CBC and Differential   Result Value Ref Range    WBC 5.65 4.50 - 10.70 10*3/mm3    RBC 5.11 4.60 - 6.00 10*6/mm3    Hemoglobin 15.3 13.7 - 17.6 g/dL    Hematocrit 46.0 40.4 - 52.2 %    MCV 90.0 79.8 - 96.2 fL    MCH 29.9 27.0 - 32.7 pg    MCHC 33.3 32.6 - 36.4 g/dL    RDW 12.9 11.5 - 14.5 %    Platelets 220 140 - 500 10*3/mm3    Neutrophil Rel % 44.3 42.7 - 76.0 %    Lymphocyte Rel % 43.7 19.6 - 45.3 %    Monocyte Rel % 8.3 5.0 - 12.0 %    Eosinophil Rel % 3.0 0.3 - 6.2 %    Basophil Rel % 0.7 0.0 - 1.5 %    Neutrophils Absolute 2.50 1.90 - 8.10 10*3/mm3    Lymphocytes Absolute 2.47  0.90 - 4.80 10*3/mm3    Monocytes Absolute 0.47 0.20 - 1.20 10*3/mm3    Eosinophils Absolute 0.17 0.00 - 0.70 10*3/mm3    Basophils Absolute 0.04 0.00 - 0.20 10*3/mm3    Immature Granulocyte Rel % 0.0 0.0 - 0.5 %    Immature Grans Absolute 0.00 0.00 - 0.03 10*3/mm3   Urinalysis With Microscopic - Urine, Clean Catch   Result Value Ref Range    Specific Gravity, UA 1.021 1.005 - 1.030    pH, UA 7.0 5.0 - 8.0    Color, UA Yellow     Appearance, UA Clear Clear    Leukocytes, UA Negative Negative    Protein Negative Negative    Glucose, UA Negative Negative    Ketones Negative Negative    Blood, UA Negative Negative    Bilirubin, UA Negative Negative    Urobilinogen, UA Comment     Nitrite, UA Negative Negative             The following portions of the patient's history were reviewed and updated as appropriate: allergies, current medications, past family history, past medical history, past social history, past surgical history and problem list.    Review of Systems   Constitutional: Negative for activity change, appetite change and unexpected weight change.   HENT: Positive for sinus pressure.         Ringing  in the ears   Eyes: Negative for visual disturbance.   Respiratory: Negative for chest tightness and shortness of breath.    Cardiovascular: Negative for chest pain and palpitations.   Gastrointestinal: Negative for abdominal distention and abdominal pain.   Skin: Negative for color change.   Neurological: Negative for syncope and speech difficulty.   Psychiatric/Behavioral: Negative for confusion and decreased concentration.       Objective   Physical Exam   Constitutional: He is oriented to person, place, and time. He appears well-developed and well-nourished.   HENT:   Head: Atraumatic.   Mouth/Throat: Oropharynx is clear and moist.   Eyes: EOM are normal. Pupils are equal, round, and reactive to light.   Neck: Normal range of motion. Neck supple. No thyromegaly present.   Cardiovascular: Normal rate and regular  rhythm.   Pulmonary/Chest: Effort normal and breath sounds normal.   Abdominal: Soft.   Musculoskeletal: Normal range of motion.   Neurological: He is alert and oriented to person, place, and time.   Skin: Skin is warm and dry.   Psychiatric: He has a normal mood and affect. His behavior is normal.   Nursing note and vitals reviewed.      Assessment/Plan   Edin was seen today for fatigue and sinus pressure.    Diagnoses and all orders for this visit:    Tension-type headache, not intractable, unspecified chronicity pattern  -     Comprehensive metabolic panel  -     Lipid panel  -     CBC and Differential  -     TSH  -     Sedimentation Rate  -     Urinalysis With Microscopic - Urine, Clean Catch    Stress  -     Comprehensive metabolic panel  -     Lipid panel  -     CBC and Differential  -     TSH  -     Sedimentation Rate  -     Urinalysis With Microscopic - Urine, Clean Catch

## 2018-11-22 LAB
ALBUMIN SERPL-MCNC: 4.6 G/DL (ref 3.5–5.2)
ALBUMIN/GLOB SERPL: 1.6 G/DL
ALP SERPL-CCNC: 57 U/L (ref 39–117)
ALT SERPL-CCNC: 43 U/L (ref 1–41)
APPEARANCE UR: CLEAR
AST SERPL-CCNC: 24 U/L (ref 1–40)
BACTERIA #/AREA URNS HPF: NORMAL /HPF
BASOPHILS # BLD AUTO: 0.04 10*3/MM3 (ref 0–0.2)
BASOPHILS NFR BLD AUTO: 0.6 % (ref 0–1.5)
BILIRUB SERPL-MCNC: 0.7 MG/DL (ref 0.1–1.2)
BILIRUB UR QL STRIP: NEGATIVE
BUN SERPL-MCNC: 16 MG/DL (ref 6–20)
BUN/CREAT SERPL: 12.7 (ref 7–25)
CALCIUM SERPL-MCNC: 10 MG/DL (ref 8.6–10.5)
CASTS URNS MICRO: NORMAL
CHLORIDE SERPL-SCNC: 101 MMOL/L (ref 98–107)
CHOLEST SERPL-MCNC: 155 MG/DL (ref 0–200)
CO2 SERPL-SCNC: 27.5 MMOL/L (ref 22–29)
COLOR UR: YELLOW
CREAT SERPL-MCNC: 1.26 MG/DL (ref 0.76–1.27)
EOSINOPHIL # BLD AUTO: 0.21 10*3/MM3 (ref 0–0.7)
EOSINOPHIL NFR BLD AUTO: 3.3 % (ref 0.3–6.2)
EPI CELLS #/AREA URNS HPF: NORMAL /HPF
ERYTHROCYTE [DISTWIDTH] IN BLOOD BY AUTOMATED COUNT: 12.7 % (ref 11.5–14.5)
ERYTHROCYTE [SEDIMENTATION RATE] IN BLOOD BY WESTERGREN METHOD: 2 MM/HR (ref 0–20)
GLOBULIN SER CALC-MCNC: 2.9 GM/DL
GLUCOSE SERPL-MCNC: 100 MG/DL (ref 65–99)
GLUCOSE UR QL: NEGATIVE
HCT VFR BLD AUTO: 45.5 % (ref 40.4–52.2)
HDLC SERPL-MCNC: 43 MG/DL (ref 40–60)
HGB BLD-MCNC: 15.5 G/DL (ref 13.7–17.6)
HGB UR QL STRIP: NEGATIVE
IMM GRANULOCYTES # BLD: 0.01 10*3/MM3 (ref 0–0.03)
IMM GRANULOCYTES NFR BLD: 0.2 % (ref 0–0.5)
KETONES UR QL STRIP: NEGATIVE
LDLC SERPL CALC-MCNC: 73 MG/DL (ref 0–100)
LEUKOCYTE ESTERASE UR QL STRIP: NEGATIVE
LYMPHOCYTES # BLD AUTO: 2.54 10*3/MM3 (ref 0.9–4.8)
LYMPHOCYTES NFR BLD AUTO: 39.4 % (ref 19.6–45.3)
MCH RBC QN AUTO: 29.5 PG (ref 27–32.7)
MCHC RBC AUTO-ENTMCNC: 34.1 G/DL (ref 32.6–36.4)
MCV RBC AUTO: 86.5 FL (ref 79.8–96.2)
MONOCYTES # BLD AUTO: 0.53 10*3/MM3 (ref 0.2–1.2)
MONOCYTES NFR BLD AUTO: 8.2 % (ref 5–12)
NEUTROPHILS # BLD AUTO: 3.13 10*3/MM3 (ref 1.9–8.1)
NEUTROPHILS NFR BLD AUTO: 48.5 % (ref 42.7–76)
NITRITE UR QL STRIP: NEGATIVE
PH UR STRIP: 7.5 [PH] (ref 5–8)
PLATELET # BLD AUTO: 216 10*3/MM3 (ref 140–500)
POTASSIUM SERPL-SCNC: 4.5 MMOL/L (ref 3.5–5.2)
PROT SERPL-MCNC: 7.5 G/DL (ref 6–8.5)
PROT UR QL STRIP: NEGATIVE
RBC # BLD AUTO: 5.26 10*6/MM3 (ref 4.6–6)
RBC #/AREA URNS HPF: NORMAL /HPF
SODIUM SERPL-SCNC: 142 MMOL/L (ref 136–145)
SP GR UR: 1.02 (ref 1–1.03)
TRIGL SERPL-MCNC: 193 MG/DL (ref 0–150)
TSH SERPL DL<=0.005 MIU/L-ACNC: 2.44 MIU/ML (ref 0.27–4.2)
UROBILINOGEN UR STRIP-MCNC: (no result) MG/DL
VLDLC SERPL CALC-MCNC: 38.6 MG/DL (ref 5–40)
WBC # BLD AUTO: 6.45 10*3/MM3 (ref 4.5–10.7)
WBC #/AREA URNS HPF: NORMAL /HPF

## 2018-12-11 ENCOUNTER — TELEPHONE (OUTPATIENT)
Dept: FAMILY MEDICINE CLINIC | Facility: CLINIC | Age: 53
End: 2018-12-11

## 2019-01-11 ENCOUNTER — OFFICE VISIT (OUTPATIENT)
Dept: FAMILY MEDICINE CLINIC | Facility: CLINIC | Age: 54
End: 2019-01-11

## 2019-01-11 VITALS
SYSTOLIC BLOOD PRESSURE: 134 MMHG | HEART RATE: 83 BPM | OXYGEN SATURATION: 97 % | HEIGHT: 75 IN | RESPIRATION RATE: 16 BRPM | WEIGHT: 236 LBS | DIASTOLIC BLOOD PRESSURE: 77 MMHG | TEMPERATURE: 98.5 F | BODY MASS INDEX: 29.34 KG/M2

## 2019-01-11 DIAGNOSIS — R09.81 SINUS CONGESTION: ICD-10-CM

## 2019-01-11 DIAGNOSIS — IMO0001 ASIAN FLU TYPE A: Primary | ICD-10-CM

## 2019-01-11 LAB
EXPIRATION DATE: ABNORMAL
FLUAV AG NPH QL: POSITIVE
FLUBV AG NPH QL: NEGATIVE
INTERNAL CONTROL: ABNORMAL
Lab: ABNORMAL

## 2019-01-11 PROCEDURE — 87804 INFLUENZA ASSAY W/OPTIC: CPT | Performed by: FAMILY MEDICINE

## 2019-01-11 PROCEDURE — 99214 OFFICE O/P EST MOD 30 MIN: CPT | Performed by: FAMILY MEDICINE

## 2019-01-11 RX ORDER — DEXLANSOPRAZOLE 60 MG/1
CAPSULE, DELAYED RELEASE ORAL
Refills: 0 | COMMUNITY
Start: 2019-01-09

## 2019-01-11 RX ORDER — OSELTAMIVIR PHOSPHATE 75 MG/1
75 CAPSULE ORAL 2 TIMES DAILY
Qty: 10 CAPSULE | Refills: 0 | Status: SHIPPED | OUTPATIENT
Start: 2019-01-11 | End: 2019-01-11 | Stop reason: SDUPTHER

## 2019-01-11 RX ORDER — LEVOFLOXACIN 500 MG/1
500 TABLET, FILM COATED ORAL DAILY
Qty: 10 TABLET | Refills: 0 | Status: SHIPPED | OUTPATIENT
Start: 2019-01-11 | End: 2019-03-14

## 2019-01-11 NOTE — PATIENT INSTRUCTIONS
Exercise 30 minutes most days of the week  Sleep 6-8 hours each night if possible  Low fat, low cholesterol diet   we discussed prescribed medications and how to take them   make sure you get results of any labs/studies ordered today  Low glycemic index diet   Tylenol when necessary and take Mucinex DM and Claritin or Zyrtec when necessary asked and drink plenty of fluids may take Tylenol for aches

## 2019-01-11 NOTE — PROGRESS NOTES
Subjective   Chief Complaint:   Chief Complaint   Patient presents with   • Sinus congestion   • Fever   • Chills   • Fatigue         History of Present Illness     Patient started flu symptoms  Few days ago. But first started with  Sinusitis symptoms. He has had sinusitis symptoms  For 1 week. He just recently started with flu symptoms. Aces all over.  Temp 98.5   ' patient today has sinus congestion and fever and chills and fatigue and aches all over.. Has been in contact with no one who has the flu.  No vomiting or diarrhea.  Does feel like in the beginning of this past week that this is more of a sinus infection which she is gotten before.  He feels congested in his sinuses and aches in his sinuses.  He also the last 2 or 3 days this felt more like the flu where he aches all over and feels very fatigued and very tired.  Blood pressures 134/77 and his heart rate is 83 and his respiration is 16 his temperature is 98.5 no vomiting and no diarrhea.  He is on BuSpar and  dexilant.  We did do flu test on him and he was positive for flu a.  Had sample of XOFLUZA and he got 80 mg today.  He took 2  Forty mg tablets.. I sent levaquin 500mg to pharmacy.   # 10.  Is also to use things to help his sinus infection such as Claritin or Zyrtec and Mucinex DM.  To rest and drink plenty of fluids and he can take Tylenol for fever and aches.            Edin Flores 53 y.o. male who presents today for Medical Management of the below listed issues and medication refills.       he has a problem list of   Patient Active Problem List   Diagnosis   • Gastroesophageal reflux disease without esophagitis   • Stress   • Tension-type headache, not intractable   .  Since the last visit, he has overall felt well.  he has been compliant with   Current Outpatient Medications:   •  busPIRone (BUSPAR) 5 MG tablet, Take 1 tablet by mouth 3 (Three) Times a Day., Disp: 30 tablet, Rfl: 0  •  DEXILANT 60 MG capsule, , Disp: , Rfl: 0  •  levoFLOXacin  "(LEVAQUIN) 500 MG tablet, Take 1 tablet by mouth Daily., Disp: 10 tablet, Rfl: 0.  he denies medication side effects.    All of the chronic condition(s) listed above are stable w/o issues.    /77   Pulse 83   Temp 98.5 °F (36.9 °C)   Resp 16   Ht 190.5 cm (75\")   Wt 107 kg (236 lb)   SpO2 97%   BMI 29.50 kg/m²     Results for orders placed or performed in visit on 01/11/19   POCT Influenza A/B   Result Value Ref Range    Rapid Influenza A Ag Positive (A) Negative    Rapid Influenza B Ag Negative Negative    Internal Control Passed Passed    Lot Number [id number]     Expiration Date 03/21/2021              The following portions of the patient's history were reviewed and updated as appropriate: allergies, current medications, past family history, past medical history, past social history, past surgical history and problem list.    Review of Systems   Constitutional: Positive for fatigue. Negative for activity change, appetite change and unexpected weight change.   HENT: Positive for congestion, postnasal drip, sinus pressure and sinus pain.    Eyes: Negative for visual disturbance.   Respiratory: Positive for cough. Negative for chest tightness, shortness of breath and wheezing.    Cardiovascular: Negative for chest pain and palpitations.   Gastrointestinal: Negative for abdominal pain.   Genitourinary: Negative for dysuria.   Musculoskeletal: Positive for arthralgias. Negative for back pain.   Skin: Negative for color change and rash.   Neurological: Negative for dizziness, syncope, speech difficulty, weakness and headaches.   Psychiatric/Behavioral: Negative for confusion and decreased concentration.       Objective   Physical Exam   Constitutional: He is oriented to person, place, and time. He appears well-developed and well-nourished.   HENT:   Right Ear: Tympanic membrane, external ear and ear canal normal.   Left Ear: Tympanic membrane, external ear and ear canal normal.   Nose: Nose normal. "   Mouth/Throat: Uvula is midline and oropharynx is clear and moist. No oropharyngeal exudate.   Eyes: Conjunctivae and EOM are normal. Pupils are equal, round, and reactive to light.   Neck: Normal range of motion. Neck supple. No thyromegaly present.   Cardiovascular: Normal rate and regular rhythm.   Pulmonary/Chest: Effort normal and breath sounds normal. No respiratory distress. He has no wheezes. He has no rales. He exhibits no tenderness.   Abdominal: Soft. Bowel sounds are normal. There is tenderness.   Musculoskeletal: Normal range of motion.   Lymphadenopathy:     He has no cervical adenopathy.   Neurological: He is alert and oriented to person, place, and time.   Skin: No rash noted.   Psychiatric: He has a normal mood and affect. His behavior is normal.   Nursing note and vitals reviewed.      Assessment/Plan   Edin was seen today for sinus congestion, fever, chills and fatigue.    Diagnoses and all orders for this visit:     flu type A    Sinus congestion  Comments:  SINUSITIS  Orders:  -     POCT Influenza A/B    Other orders  -     levoFLOXacin (LEVAQUIN) 500 MG tablet; Take 1 tablet by mouth Daily.

## 2019-03-14 ENCOUNTER — OFFICE VISIT (OUTPATIENT)
Dept: FAMILY MEDICINE CLINIC | Facility: CLINIC | Age: 54
End: 2019-03-14

## 2019-03-14 VITALS
OXYGEN SATURATION: 98 % | BODY MASS INDEX: 29.47 KG/M2 | DIASTOLIC BLOOD PRESSURE: 90 MMHG | WEIGHT: 237 LBS | RESPIRATION RATE: 16 BRPM | SYSTOLIC BLOOD PRESSURE: 140 MMHG | TEMPERATURE: 98 F | HEART RATE: 82 BPM | HEIGHT: 75 IN

## 2019-03-14 DIAGNOSIS — J01.91 ACUTE RECURRENT SINUSITIS, UNSPECIFIED LOCATION: Primary | ICD-10-CM

## 2019-03-14 PROCEDURE — 99213 OFFICE O/P EST LOW 20 MIN: CPT | Performed by: NURSE PRACTITIONER

## 2019-03-14 RX ORDER — AMOXICILLIN AND CLAVULANATE POTASSIUM 875; 125 MG/1; MG/1
1 TABLET, FILM COATED ORAL 2 TIMES DAILY
Qty: 20 TABLET | Refills: 0 | Status: SHIPPED | OUTPATIENT
Start: 2019-03-14 | End: 2019-03-24

## 2019-03-14 RX ORDER — MONTELUKAST SODIUM 10 MG/1
10 TABLET ORAL
Refills: 1 | COMMUNITY
Start: 2019-03-07 | End: 2019-11-20 | Stop reason: SDUPTHER

## 2019-03-14 RX ORDER — PREDNISONE 20 MG/1
20 TABLET ORAL 2 TIMES DAILY
Qty: 10 TABLET | Refills: 0 | Status: SHIPPED | OUTPATIENT
Start: 2019-03-14 | End: 2019-03-19

## 2019-03-14 NOTE — PROGRESS NOTES
Subjective   Edin Flores is a 53 y.o. male.     History of Present Illness   Edin Flores 53 y.o. male who presents for evaluation of sinus pressure and congestion. Symptoms include congestion, nasal blockage, post nasal drip, dry cough, headache and sinus pain.  Onset of symptoms was 5 days ago, gradually worsening since that time. Patient denies wheezing.   Evaluation to date: none Treatment to date:  none. Reports green nasal discharge.     The following portions of the patient's history were reviewed and updated as appropriate: allergies, current medications, past family history, past medical history, past social history, past surgical history and problem list.    Review of Systems   Constitutional: Positive for fatigue. Negative for chills and fever.   HENT: Positive for congestion, postnasal drip, rhinorrhea, sinus pressure and sinus pain. Negative for ear pain and sore throat.    Respiratory: Positive for cough. Negative for chest tightness, shortness of breath and wheezing.    Musculoskeletal: Negative for myalgias.       Objective   Physical Exam   Constitutional: He is oriented to person, place, and time. He appears well-developed and well-nourished.   HENT:   Right Ear: Tympanic membrane, external ear and ear canal normal.   Left Ear: Tympanic membrane, external ear and ear canal normal.   Nose: Mucosal edema and rhinorrhea present. Right sinus exhibits maxillary sinus tenderness. Right sinus exhibits no frontal sinus tenderness. Left sinus exhibits maxillary sinus tenderness. Left sinus exhibits no frontal sinus tenderness.   Mouth/Throat: Uvula is midline and oropharynx is clear and moist.   Cardiovascular: Normal rate and regular rhythm.   Pulmonary/Chest: Effort normal and breath sounds normal.   Neurological: He is oriented to person, place, and time.   Skin: Skin is warm and dry.   Psychiatric: He has a normal mood and affect. His behavior is normal. Judgment and thought content normal.    Nursing note and vitals reviewed.      Assessment/Plan   Edin was seen today for nasal congestion and cough.    Diagnoses and all orders for this visit:    Acute recurrent sinusitis, unspecified location  -     amoxicillin-clavulanate (AUGMENTIN) 875-125 MG per tablet; Take 1 tablet by mouth 2 (Two) Times a Day for 10 days.  -     predniSONE (DELTASONE) 20 MG tablet; Take 1 tablet by mouth 2 (Two) Times a Day for 5 days.

## 2019-06-05 ENCOUNTER — OFFICE VISIT (OUTPATIENT)
Dept: FAMILY MEDICINE CLINIC | Facility: CLINIC | Age: 54
End: 2019-06-05

## 2019-06-05 VITALS
WEIGHT: 227 LBS | HEIGHT: 75 IN | SYSTOLIC BLOOD PRESSURE: 137 MMHG | BODY MASS INDEX: 28.23 KG/M2 | OXYGEN SATURATION: 98 % | TEMPERATURE: 98.1 F | DIASTOLIC BLOOD PRESSURE: 83 MMHG | HEART RATE: 60 BPM

## 2019-06-05 DIAGNOSIS — R07.81 RIB PAIN: Primary | ICD-10-CM

## 2019-06-05 PROCEDURE — 99213 OFFICE O/P EST LOW 20 MIN: CPT | Performed by: FAMILY MEDICINE

## 2019-06-05 NOTE — PROGRESS NOTES
"Subjective   Chief Complaint:   Chief Complaint   Patient presents with   • Chest Pain     left rib pain 3 days         History of Present Illness             Edin Flores 54 y.o. male who presents today for left rib pain. He was bending over his boat working on it for several hours.   No diagnosis found.     he has a problem list of   Patient Active Problem List   Diagnosis   • Gastroesophageal reflux disease without esophagitis   • Stress   • Tension-type headache, not intractable   .  Since the last visit, he has overall felt well.  he has been compliant with   Current Outpatient Medications:   •  busPIRone (BUSPAR) 5 MG tablet, Take 1 tablet by mouth 3 (Three) Times a Day., Disp: 30 tablet, Rfl: 0  •  DEXILANT 60 MG capsule, , Disp: , Rfl: 0  •  montelukast (SINGULAIR) 10 MG tablet, Take 10 mg by mouth every night at bedtime., Disp: , Rfl: 1.  he denies medication side effects.    All of the chronic condition(s) listed above are stable w/o issues.    /83   Pulse 60   Temp 98.1 °F (36.7 °C)   Ht 190.5 cm (75\")   Wt 103 kg (227 lb)   SpO2 98%   BMI 28.37 kg/m²               The following portions of the patient's history were reviewed and updated as appropriate: allergies, current medications, past family history, past medical history, past social history, past surgical history and problem list.    Review of Systems   Constitutional: Negative.    HENT: Negative for congestion and sinus pain.    Eyes: Negative for discharge.   Respiratory: Negative for apnea and chest tightness.    Cardiovascular: Negative for chest pain.   Gastrointestinal: Negative for abdominal pain.   Musculoskeletal:        Left rib pain       Objective   Physical Exam   Constitutional: He is oriented to person, place, and time. He appears well-developed and well-nourished.   HENT:   Head: Atraumatic.   Mouth/Throat: Oropharynx is clear and moist.   Eyes: EOM are normal. Pupils are equal, round, and reactive to light.   Neck: " Normal range of motion. Neck supple.   Cardiovascular: Normal rate and regular rhythm.   Pulmonary/Chest: Effort normal and breath sounds normal.   Abdominal: Soft.   Musculoskeletal:   Left rib area tender, pain on ROM   Neurological: He is alert and oriented to person, place, and time.   Skin: Skin is warm and dry.   Psychiatric: He has a normal mood and affect. His behavior is normal.   Nursing note and vitals reviewed.      Assessment/Plan   Edin was seen today for chest pain.    Diagnoses and all orders for this visit:    Rib pain

## 2019-11-20 ENCOUNTER — OFFICE VISIT (OUTPATIENT)
Dept: FAMILY MEDICINE CLINIC | Facility: CLINIC | Age: 54
End: 2019-11-20

## 2019-11-20 VITALS
HEART RATE: 53 BPM | BODY MASS INDEX: 28.97 KG/M2 | HEIGHT: 75 IN | RESPIRATION RATE: 16 BRPM | WEIGHT: 233 LBS | DIASTOLIC BLOOD PRESSURE: 82 MMHG | SYSTOLIC BLOOD PRESSURE: 122 MMHG | TEMPERATURE: 97.6 F | OXYGEN SATURATION: 97 %

## 2019-11-20 DIAGNOSIS — J01.90 ACUTE SINUSITIS, RECURRENCE NOT SPECIFIED, UNSPECIFIED LOCATION: Primary | ICD-10-CM

## 2019-11-20 PROCEDURE — 99213 OFFICE O/P EST LOW 20 MIN: CPT | Performed by: PHYSICIAN ASSISTANT

## 2019-11-20 RX ORDER — MONTELUKAST SODIUM 10 MG/1
10 TABLET ORAL
Qty: 90 TABLET | Refills: 3 | Status: SHIPPED | OUTPATIENT
Start: 2019-11-20 | End: 2021-03-15 | Stop reason: SDUPTHER

## 2019-11-20 RX ORDER — AMOXICILLIN AND CLAVULANATE POTASSIUM 875; 125 MG/1; MG/1
1 TABLET, FILM COATED ORAL EVERY 12 HOURS SCHEDULED
Qty: 20 TABLET | Refills: 1 | Status: SHIPPED | OUTPATIENT
Start: 2019-11-20 | End: 2020-04-30

## 2019-11-20 RX ORDER — PREDNISONE 20 MG/1
20 TABLET ORAL 2 TIMES DAILY
Qty: 10 TABLET | Refills: 0 | Status: SHIPPED | OUTPATIENT
Start: 2019-11-20 | End: 2020-04-30

## 2019-11-20 NOTE — PROGRESS NOTES
Subjective   Edin Flores is a 54 y.o. male.     History of Present Illness   Edin Flores 54 y.o. male who presents for evaluation of sinus pressure and congestion, fatigue. Symptoms include ear pressure, congestion, sneezing, nasal blockage, post nasal drip,  described as productive of yellow sputum, sinus pain.  Onset of symptoms was 6 days ago, gradually worsening since that time. Patient denies shortness of breath, wheezing, fever.   Evaluation to date: none Treatment to date:  OTC antihistamines and Mucinex D; Singulair.     Did better with last Rx with oral pred    The following portions of the patient's history were reviewed and updated as appropriate: allergies, current medications, past family history, past medical history, past social history, past surgical history and problem list.    Review of Systems   Constitutional: Positive for fatigue. Negative for activity change, appetite change and unexpected weight change.   HENT: Positive for congestion, postnasal drip, rhinorrhea, sinus pressure, sinus pain and sneezing. Negative for nosebleeds and trouble swallowing.    Eyes: Positive for itching. Negative for pain and visual disturbance.   Respiratory: Negative for chest tightness, shortness of breath and wheezing.    Cardiovascular: Negative for chest pain and palpitations.   Gastrointestinal: Negative for abdominal pain and blood in stool.   Endocrine: Negative.    Genitourinary: Negative for difficulty urinating and hematuria.   Musculoskeletal: Negative for joint swelling.   Skin: Negative for color change and rash.   Allergic/Immunologic: Negative.    Neurological: Negative for syncope and speech difficulty.   Hematological: Negative for adenopathy.   Psychiatric/Behavioral: Negative for agitation and confusion.   All other systems reviewed and are negative.      Objective   Physical Exam   Constitutional: He is oriented to person, place, and time. He appears well-developed and well-nourished.   Non-toxic appearance. No distress.   HENT:   Head: Normocephalic and atraumatic. Hair is normal.   Right Ear: External ear normal. No drainage, swelling or tenderness. Tympanic membrane is retracted.   Left Ear: External ear normal. No drainage, swelling or tenderness. Tympanic membrane is retracted.   Nose: Mucosal edema present. No epistaxis. Right sinus exhibits maxillary sinus tenderness. Left sinus exhibits maxillary sinus tenderness.   Mouth/Throat: Uvula is midline and mucous membranes are normal. No oral lesions. No uvula swelling. Posterior oropharyngeal erythema present. No oropharyngeal exudate.   Eyes: Conjunctivae and EOM are normal. Pupils are equal, round, and reactive to light. Right eye exhibits no discharge. Left eye exhibits no discharge. No scleral icterus.   Neck: Normal range of motion. Neck supple.   Cardiovascular: Normal rate, regular rhythm and normal heart sounds. Exam reveals no gallop.   No murmur heard.  Pulmonary/Chest: Breath sounds normal. No stridor. No respiratory distress. He has no wheezes. He has no rales. He exhibits no tenderness.   Abdominal: Soft. There is no tenderness.   Lymphadenopathy:     He has cervical adenopathy.   Neurological: He is alert and oriented to person, place, and time. He exhibits normal muscle tone.   Skin: Skin is warm and dry. No rash noted. He is not diaphoretic.   Psychiatric: He has a normal mood and affect. His behavior is normal. Judgment and thought content normal.   Nursing note and vitals reviewed.      Assessment/Plan   Edin was seen today for sinusitis.    Diagnoses and all orders for this visit:    Acute sinusitis, recurrence not specified, unspecified location    Other orders  -     montelukast (SINGULAIR) 10 MG tablet; Take 1 tablet by mouth every night at bedtime. For allergies    will Rx Augmentin and let him do oral pred 5 days --helped last time

## 2019-11-20 NOTE — PATIENT INSTRUCTIONS
Sinusitis, Adult  Sinusitis is inflammation of your sinuses. Sinuses are hollow spaces in the bones around your face. Your sinuses are located:  · Around your eyes.  · In the middle of your forehead.  · Behind your nose.  · In your cheekbones.  Mucus normally drains out of your sinuses. When your nasal tissues become inflamed or swollen, mucus can become trapped or blocked. This allows bacteria, viruses, and fungi to grow, which leads to infection. Most infections of the sinuses are caused by a virus.  Sinusitis can develop quickly. It can last for up to 4 weeks (acute) or for more than 12 weeks (chronic). Sinusitis often develops after a cold.  What are the causes?  This condition is caused by anything that creates swelling in the sinuses or stops mucus from draining. This includes:  · Allergies.  · Asthma.  · Infection from bacteria or viruses.  · Deformities or blockages in your nose or sinuses.  · Abnormal growths in the nose (nasal polyps).  · Pollutants, such as chemicals or irritants in the air.  · Infection from fungi (rare).  What increases the risk?  You are more likely to develop this condition if you:  · Have a weak body defense system (immune system).  · Do a lot of swimming or diving.  · Overuse nasal sprays.  · Smoke.  What are the signs or symptoms?  The main symptoms of this condition are pain and a feeling of pressure around the affected sinuses. Other symptoms include:  · Stuffy nose or congestion.  · Thick drainage from your nose.  · Swelling and warmth over the affected sinuses.  · Headache.  · Upper toothache.  · A cough that may get worse at night.  · Extra mucus that collects in the throat or the back of the nose (postnasal drip).  · Decreased sense of smell and taste.  · Fatigue.  · A fever.  · Sore throat.  · Bad breath.  How is this diagnosed?  This condition is diagnosed based on:  · Your symptoms.  · Your medical history.  · A physical exam.  · Tests to find out if your condition is  acute or chronic. This may include:  ? Checking your nose for nasal polyps.  ? Viewing your sinuses using a device that has a light (endoscope).  ? Testing for allergies or bacteria.  ? Imaging tests, such as an MRI or CT scan.  In rare cases, a bone biopsy may be done to rule out more serious types of fungal sinus disease.  How is this treated?  Treatment for sinusitis depends on the cause and whether your condition is chronic or acute.  · If caused by a virus, your symptoms should go away on their own within 10 days. You may be given medicines to relieve symptoms. They include:  ? Medicines that shrink swollen nasal passages (topical intranasal decongestants).  ? Medicines that treat allergies (antihistamines).  ? A spray that eases inflammation of the nostrils (topical intranasal corticosteroids).  ? Rinses that help get rid of thick mucus in your nose (nasal saline washes).  · If caused by bacteria, your health care provider may recommend waiting to see if your symptoms improve. Most bacterial infections will get better without antibiotic medicine. You may be given antibiotics if you have:  ? A severe infection.  ? A weak immune system.  · If caused by narrow nasal passages or nasal polyps, you may need to have surgery.  Follow these instructions at home:  Medicines  · Take, use, or apply over-the-counter and prescription medicines only as told by your health care provider. These may include nasal sprays.  · If you were prescribed an antibiotic medicine, take it as told by your health care provider. Do not stop taking the antibiotic even if you start to feel better.  Hydrate and humidify    · Drink enough fluid to keep your urine pale yellow. Staying hydrated will help to thin your mucus.  · Use a cool mist humidifier to keep the humidity level in your home above 50%.  · Inhale steam for 10-15 minutes, 3-4 times a day, or as told by your health care provider. You can do this in the bathroom while a hot shower is  running.  · Limit your exposure to cool or dry air.  Rest  · Rest as much as possible.  · Sleep with your head raised (elevated).  · Make sure you get enough sleep each night.  General instructions    · Apply a warm, moist washcloth to your face 3-4 times a day or as told by your health care provider. This will help with discomfort.  · Wash your hands often with soap and water to reduce your exposure to germs. If soap and water are not available, use hand .  · Do not smoke. Avoid being around people who are smoking (secondhand smoke).  · Keep all follow-up visits as told by your health care provider. This is important.  Contact a health care provider if:  · You have a fever.  · Your symptoms get worse.  · Your symptoms do not improve within 10 days.  Get help right away if:  · You have a severe headache.  · You have persistent vomiting.  · You have severe pain or swelling around your face or eyes.  · You have vision problems.  · You develop confusion.  · Your neck is stiff.  · You have trouble breathing.  Summary  · Sinusitis is soreness and inflammation of your sinuses. Sinuses are hollow spaces in the bones around your face.  · This condition is caused by nasal tissues that become inflamed or swollen. The swelling traps or blocks the flow of mucus. This allows bacteria, viruses, and fungi to grow, which leads to infection.  · If you were prescribed an antibiotic medicine, take it as told by your health care provider. Do not stop taking the antibiotic even if you start to feel better.  · Keep all follow-up visits as told by your health care provider. This is important.  This information is not intended to replace advice given to you by your health care provider. Make sure you discuss any questions you have with your health care provider.  Document Released: 12/18/2006 Document Revised: 05/20/2019 Document Reviewed: 05/20/2019  [employer] Interactive Patient Education © 2019 [employer]

## 2020-04-30 ENCOUNTER — TELEMEDICINE (OUTPATIENT)
Dept: FAMILY MEDICINE CLINIC | Facility: CLINIC | Age: 55
End: 2020-04-30

## 2020-04-30 DIAGNOSIS — J01.90 ACUTE SINUSITIS, RECURRENCE NOT SPECIFIED, UNSPECIFIED LOCATION: Primary | ICD-10-CM

## 2020-04-30 PROCEDURE — 99213 OFFICE O/P EST LOW 20 MIN: CPT | Performed by: PHYSICIAN ASSISTANT

## 2020-04-30 RX ORDER — PREDNISONE 20 MG/1
20 TABLET ORAL 2 TIMES DAILY
Qty: 10 TABLET | Refills: 0 | Status: SHIPPED | OUTPATIENT
Start: 2020-04-30 | End: 2020-08-25

## 2020-04-30 RX ORDER — AMOXICILLIN AND CLAVULANATE POTASSIUM 875; 125 MG/1; MG/1
1 TABLET, FILM COATED ORAL EVERY 12 HOURS SCHEDULED
Qty: 20 TABLET | Refills: 1 | Status: SHIPPED | OUTPATIENT
Start: 2020-04-30 | End: 2020-08-25

## 2020-04-30 NOTE — PATIENT INSTRUCTIONS
Sinusitis, Adult  Sinusitis is inflammation of your sinuses. Sinuses are hollow spaces in the bones around your face. Your sinuses are located:  · Around your eyes.  · In the middle of your forehead.  · Behind your nose.  · In your cheekbones.  Mucus normally drains out of your sinuses. When your nasal tissues become inflamed or swollen, mucus can become trapped or blocked. This allows bacteria, viruses, and fungi to grow, which leads to infection. Most infections of the sinuses are caused by a virus.  Sinusitis can develop quickly. It can last for up to 4 weeks (acute) or for more than 12 weeks (chronic). Sinusitis often develops after a cold.  What are the causes?  This condition is caused by anything that creates swelling in the sinuses or stops mucus from draining. This includes:  · Allergies.  · Asthma.  · Infection from bacteria or viruses.  · Deformities or blockages in your nose or sinuses.  · Abnormal growths in the nose (nasal polyps).  · Pollutants, such as chemicals or irritants in the air.  · Infection from fungi (rare).  What increases the risk?  You are more likely to develop this condition if you:  · Have a weak body defense system (immune system).  · Do a lot of swimming or diving.  · Overuse nasal sprays.  · Smoke.  What are the signs or symptoms?  The main symptoms of this condition are pain and a feeling of pressure around the affected sinuses. Other symptoms include:  · Stuffy nose or congestion.  · Thick drainage from your nose.  · Swelling and warmth over the affected sinuses.  · Headache.  · Upper toothache.  · A cough that may get worse at night.  · Extra mucus that collects in the throat or the back of the nose (postnasal drip).  · Decreased sense of smell and taste.  · Fatigue.  · A fever.  · Sore throat.  · Bad breath.  How is this diagnosed?  This condition is diagnosed based on:  · Your symptoms.  · Your medical history.  · A physical exam.  · Tests to find out if your condition is  acute or chronic. This may include:  ? Checking your nose for nasal polyps.  ? Viewing your sinuses using a device that has a light (endoscope).  ? Testing for allergies or bacteria.  ? Imaging tests, such as an MRI or CT scan.  In rare cases, a bone biopsy may be done to rule out more serious types of fungal sinus disease.  How is this treated?  Treatment for sinusitis depends on the cause and whether your condition is chronic or acute.  · If caused by a virus, your symptoms should go away on their own within 10 days. You may be given medicines to relieve symptoms. They include:  ? Medicines that shrink swollen nasal passages (topical intranasal decongestants).  ? Medicines that treat allergies (antihistamines).  ? A spray that eases inflammation of the nostrils (topical intranasal corticosteroids).  ? Rinses that help get rid of thick mucus in your nose (nasal saline washes).  · If caused by bacteria, your health care provider may recommend waiting to see if your symptoms improve. Most bacterial infections will get better without antibiotic medicine. You may be given antibiotics if you have:  ? A severe infection.  ? A weak immune system.  · If caused by narrow nasal passages or nasal polyps, you may need to have surgery.  Follow these instructions at home:  Medicines  · Take, use, or apply over-the-counter and prescription medicines only as told by your health care provider. These may include nasal sprays.  · If you were prescribed an antibiotic medicine, take it as told by your health care provider. Do not stop taking the antibiotic even if you start to feel better.  Hydrate and humidify    · Drink enough fluid to keep your urine pale yellow. Staying hydrated will help to thin your mucus.  · Use a cool mist humidifier to keep the humidity level in your home above 50%.  · Inhale steam for 10-15 minutes, 3-4 times a day, or as told by your health care provider. You can do this in the bathroom while a hot shower is  running.  · Limit your exposure to cool or dry air.  Rest  · Rest as much as possible.  · Sleep with your head raised (elevated).  · Make sure you get enough sleep each night.  General instructions    · Apply a warm, moist washcloth to your face 3-4 times a day or as told by your health care provider. This will help with discomfort.  · Wash your hands often with soap and water to reduce your exposure to germs. If soap and water are not available, use hand .  · Do not smoke. Avoid being around people who are smoking (secondhand smoke).  · Keep all follow-up visits as told by your health care provider. This is important.  Contact a health care provider if:  · You have a fever.  · Your symptoms get worse.  · Your symptoms do not improve within 10 days.  Get help right away if:  · You have a severe headache.  · You have persistent vomiting.  · You have severe pain or swelling around your face or eyes.  · You have vision problems.  · You develop confusion.  · Your neck is stiff.  · You have trouble breathing.  Summary  · Sinusitis is soreness and inflammation of your sinuses. Sinuses are hollow spaces in the bones around your face.  · This condition is caused by nasal tissues that become inflamed or swollen. The swelling traps or blocks the flow of mucus. This allows bacteria, viruses, and fungi to grow, which leads to infection.  · If you were prescribed an antibiotic medicine, take it as told by your health care provider. Do not stop taking the antibiotic even if you start to feel better.  · Keep all follow-up visits as told by your health care provider. This is important.  This information is not intended to replace advice given to you by your health care provider. Make sure you discuss any questions you have with your health care provider.  Document Released: 12/18/2006 Document Revised: 05/20/2019 Document Reviewed: 05/20/2019  [employer] Interactive Patient Education © 2020 [employer]

## 2020-04-30 NOTE — PROGRESS NOTES
Subjective   Edin Flores is a 55 y.o. male.   You have chosen to receive care through a telehealth visit.  Do you consent to use a video/audio connection for your medical care today? Yes  Facetime  History of Present Illness   Edin Flores 55 y.o. male who presents for evaluation of sinus pressure and congestion, cough. Symptoms include some fatigue,, sinus pressure with the drainage so thick that it just cannot come out now.  He gets 1-2 sinus infections a year and this is just not going away.  Some stomach upset from the drainage..  Onset of symptoms was 6 days ago, gradually worsening since that time. Patient denies shortness of breath, wheezing, fever, body aches, loss of taste or smell.   Evaluation to date: none Treatment to date:  Mucinex.   No history of diabetes  Friday onset  Did well last time with Augmentin and pred really helped  Sinus pressure   The following portions of the patient's history were reviewed and updated as appropriate: allergies, current medications, past family history, past medical history, past social history, past surgical history and problem list.    Review of Systems   Constitutional: Negative for activity change, appetite change, fever and unexpected weight change.   HENT: Positive for congestion, postnasal drip, sinus pressure and sinus pain. Negative for ear discharge, nosebleeds and trouble swallowing.    Eyes: Negative for pain and visual disturbance.   Respiratory: Negative for chest tightness, shortness of breath and wheezing.    Cardiovascular: Negative for chest pain and palpitations.   Gastrointestinal: Negative for abdominal pain and blood in stool.   Endocrine: Negative.    Genitourinary: Negative for difficulty urinating and hematuria.   Musculoskeletal: Negative for joint swelling and myalgias.   Skin: Negative for color change and rash.   Allergic/Immunologic: Negative.    Neurological: Negative for syncope and speech difficulty.   Hematological: Negative for  adenopathy.   Psychiatric/Behavioral: Negative for agitation and confusion.   All other systems reviewed and are negative.      Objective   Physical Exam   Constitutional: He is oriented to person, place, and time. He appears well-developed and well-nourished. No distress.   HENT:   Head: Normocephalic and atraumatic.   Right Ear: External ear normal.   Left Ear: External ear normal.   Eyes: Conjunctivae are normal. Right eye exhibits no discharge. Left eye exhibits no discharge. No scleral icterus.   Neck: Normal range of motion. No tracheal deviation present.   Pulmonary/Chest: Effort normal. No stridor. No respiratory distress.   Abdominal: Soft. There is no guarding.   Musculoskeletal: Normal range of motion. He exhibits no deformity.   Neurological: He is alert and oriented to person, place, and time. He exhibits normal muscle tone. Coordination normal.   Skin: He is not diaphoretic. No pallor.   Psychiatric: He has a normal mood and affect. His behavior is normal. Judgment and thought content normal.       Assessment/Plan   Edin was seen today for sinusitis.    Diagnoses and all orders for this visit:    Acute sinusitis, recurrence not specified, unspecified location    Other orders  -     predniSONE (DELTASONE) 20 MG tablet; Take 1 tablet by mouth 2 (Two) Times a Day. With food for 5 days  -     amoxicillin-clavulanate (Augmentin) 875-125 MG per tablet; Take 1 tablet by mouth Every 12 (Twelve) Hours. One PO BID for infection with food        Time spent 15 minutes  Sent Augmentin for sinus infection  Start the prednisone for the sinus pressure to break it up

## 2020-07-08 ENCOUNTER — TELEPHONE (OUTPATIENT)
Dept: FAMILY MEDICINE CLINIC | Facility: CLINIC | Age: 55
End: 2020-07-08

## 2020-07-08 DIAGNOSIS — Z00.00 ROUTINE GENERAL MEDICAL EXAMINATION AT A HEALTH CARE FACILITY: Primary | ICD-10-CM

## 2020-07-08 DIAGNOSIS — Z12.5 SPECIAL SCREENING FOR MALIGNANT NEOPLASM OF PROSTATE: ICD-10-CM

## 2020-08-04 LAB
ALBUMIN SERPL-MCNC: 4.9 G/DL (ref 3.5–5.2)
ALBUMIN/GLOB SERPL: 2.3 G/DL
ALP SERPL-CCNC: 56 U/L (ref 39–117)
ALT SERPL-CCNC: 63 U/L (ref 1–41)
AST SERPL-CCNC: 32 U/L (ref 1–40)
BASOPHILS # BLD AUTO: 0.06 10*3/MM3 (ref 0–0.2)
BASOPHILS NFR BLD AUTO: 0.9 % (ref 0–1.5)
BILIRUB SERPL-MCNC: 0.6 MG/DL (ref 0–1.2)
BUN SERPL-MCNC: 16 MG/DL (ref 6–20)
BUN/CREAT SERPL: 13.3 (ref 7–25)
CALCIUM SERPL-MCNC: 9.6 MG/DL (ref 8.6–10.5)
CHLORIDE SERPL-SCNC: 101 MMOL/L (ref 98–107)
CHOLEST SERPL-MCNC: 177 MG/DL (ref 0–200)
CO2 SERPL-SCNC: 28.6 MMOL/L (ref 22–29)
CREAT SERPL-MCNC: 1.2 MG/DL (ref 0.76–1.27)
EOSINOPHIL # BLD AUTO: 0.15 10*3/MM3 (ref 0–0.4)
EOSINOPHIL NFR BLD AUTO: 2.2 % (ref 0.3–6.2)
ERYTHROCYTE [DISTWIDTH] IN BLOOD BY AUTOMATED COUNT: 12.6 % (ref 12.3–15.4)
GLOBULIN SER CALC-MCNC: 2.1 GM/DL
GLUCOSE SERPL-MCNC: 105 MG/DL (ref 65–99)
HCT VFR BLD AUTO: 46.2 % (ref 37.5–51)
HDLC SERPL-MCNC: 41 MG/DL (ref 40–60)
HGB BLD-MCNC: 15.1 G/DL (ref 13–17.7)
IMM GRANULOCYTES # BLD AUTO: 0.01 10*3/MM3 (ref 0–0.05)
IMM GRANULOCYTES NFR BLD AUTO: 0.1 % (ref 0–0.5)
LDLC SERPL CALC-MCNC: 96 MG/DL (ref 0–100)
LYMPHOCYTES # BLD AUTO: 2.94 10*3/MM3 (ref 0.7–3.1)
LYMPHOCYTES NFR BLD AUTO: 42.7 % (ref 19.6–45.3)
MCH RBC QN AUTO: 29.3 PG (ref 26.6–33)
MCHC RBC AUTO-ENTMCNC: 32.7 G/DL (ref 31.5–35.7)
MCV RBC AUTO: 89.5 FL (ref 79–97)
MONOCYTES # BLD AUTO: 0.7 10*3/MM3 (ref 0.1–0.9)
MONOCYTES NFR BLD AUTO: 10.2 % (ref 5–12)
NEUTROPHILS # BLD AUTO: 3.03 10*3/MM3 (ref 1.7–7)
NEUTROPHILS NFR BLD AUTO: 43.9 % (ref 42.7–76)
NRBC BLD AUTO-RTO: 0 /100 WBC (ref 0–0.2)
PLATELET # BLD AUTO: 204 10*3/MM3 (ref 140–450)
POTASSIUM SERPL-SCNC: 4.6 MMOL/L (ref 3.5–5.2)
PROT SERPL-MCNC: 7 G/DL (ref 6–8.5)
PSA SERPL-MCNC: 0.99 NG/ML (ref 0–4)
RBC # BLD AUTO: 5.16 10*6/MM3 (ref 4.14–5.8)
SODIUM SERPL-SCNC: 139 MMOL/L (ref 136–145)
TRIGL SERPL-MCNC: 200 MG/DL (ref 0–150)
TSH SERPL DL<=0.005 MIU/L-ACNC: 2.33 UIU/ML (ref 0.27–4.2)
VLDLC SERPL CALC-MCNC: 40 MG/DL
WBC # BLD AUTO: 6.89 10*3/MM3 (ref 3.4–10.8)

## 2020-08-06 VITALS — WEIGHT: 237 LBS | HEIGHT: 75 IN

## 2020-08-07 ENCOUNTER — TELEHEALTH PROVIDED OTHER THAN IN PATIENT'S HOME (OUTPATIENT)
Dept: URBAN - METROPOLITAN AREA TELEHEALTH 6 | Facility: TELEHEALTH | Age: 55
End: 2020-08-07

## 2020-08-07 DIAGNOSIS — K30 FUNCTIONAL DYSPEPSIA: ICD-10-CM

## 2020-08-07 DIAGNOSIS — K21.9 GASTRO-ESOPHAGEAL REFLUX DISEASE WITHOUT ESOPHAGITIS: ICD-10-CM

## 2020-08-07 DIAGNOSIS — R10.13 EPIGASTRIC PAIN: ICD-10-CM

## 2020-08-07 DIAGNOSIS — Z12.11 ENCOUNTER FOR SCREENING FOR MALIGNANT NEOPLASM OF COLON: ICD-10-CM

## 2020-08-07 DIAGNOSIS — R07.89 OTHER CHEST PAIN: ICD-10-CM

## 2020-08-07 PROCEDURE — 99212 OFFICE O/P EST SF 10 MIN: CPT | Mod: 95 | Performed by: NURSE PRACTITIONER

## 2020-08-07 RX ORDER — FAMOTIDINE 40 MG/1
40 TABLET, FILM COATED ORAL
Qty: 30 | Refills: 11 | Status: COMPLETED
Start: 2020-08-07 | End: 2022-03-08

## 2020-08-25 ENCOUNTER — OFFICE VISIT (OUTPATIENT)
Dept: FAMILY MEDICINE CLINIC | Facility: CLINIC | Age: 55
End: 2020-08-25

## 2020-08-25 VITALS
HEIGHT: 75 IN | DIASTOLIC BLOOD PRESSURE: 84 MMHG | BODY MASS INDEX: 33.94 KG/M2 | SYSTOLIC BLOOD PRESSURE: 133 MMHG | RESPIRATION RATE: 16 BRPM | OXYGEN SATURATION: 97 % | TEMPERATURE: 97.3 F | HEART RATE: 64 BPM | WEIGHT: 273 LBS

## 2020-08-25 DIAGNOSIS — Z00.00 ANNUAL PHYSICAL EXAM: Primary | ICD-10-CM

## 2020-08-25 PROCEDURE — 99396 PREV VISIT EST AGE 40-64: CPT | Performed by: FAMILY MEDICINE

## 2020-08-25 PROCEDURE — 93000 ELECTROCARDIOGRAM COMPLETE: CPT | Performed by: FAMILY MEDICINE

## 2020-08-25 RX ORDER — BUSPIRONE HYDROCHLORIDE 5 MG/1
5 TABLET ORAL 3 TIMES DAILY
Qty: 30 TABLET | Refills: 0 | Status: SHIPPED | OUTPATIENT
Start: 2020-08-25 | End: 2022-05-24

## 2020-08-25 RX ORDER — FAMOTIDINE 40 MG/1
TABLET, FILM COATED ORAL
COMMUNITY
Start: 2020-08-07

## 2020-08-25 NOTE — PROGRESS NOTES
Procedure     ECG 12 Lead  Date/Time: 8/25/2020 5:09 PM  Performed by: Chao Morton MD  Authorized by: Chao Morton MD   Previous ECG: no previous ECG available  Rhythm: sinus rhythm  Rate: normal  BPM: 72  Conduction: conduction normal  ST Segments: ST segments normal  T Waves: T waves normal  QRS axis: normal  Other: no other findings    Clinical impression: normal ECG

## 2020-08-25 NOTE — PROGRESS NOTES
"Subjective   Chief Complaint:   Chief Complaint   Patient presents with   • Annual Exam         History of Present Illness Patient came in to the office for a CPE. EKG is sinus rhythm with a rate of 72. No prior EKG for comparison. He is feeling good, a little fatigued but without any other complaints. We reviewed his labs in detail: PS 0.988, TSH 2.33, glucose 105, BUN 16, creatinine 1.20, GFR 63, cholesterol 177, triglycerides 200, CBC unremarkable, ALT 63. He is on Buspar 5mg, Dexilant 60mg, Singulair 10mg. I refilled his Buspar today. I will order a total and free testosterone on him.         Past Medical History:   Diagnosis Date   • Encounter for special screening examination for neoplasm of prostate 2013    normal   • GERD (gastroesophageal reflux disease)         Edin Flores 55 y.o. male who presents today for an Annual Physical Exam.    ICD-10-CM ICD-9-CM   1. Annual physical exam Z00.00 V70.0        he has a problem list of   Patient Active Problem List   Diagnosis   • Gastroesophageal reflux disease without esophagitis   • Stress   • Tension-type headache, not intractable   .    he has been compliant with   Current Outpatient Medications:   •  busPIRone (BUSPAR) 5 MG tablet, Take 1 tablet by mouth 3 (Three) Times a Day., Disp: 30 tablet, Rfl: 0  •  DEXILANT 60 MG capsule, , Disp: , Rfl: 0  •  montelukast (SINGULAIR) 10 MG tablet, Take 1 tablet by mouth every night at bedtime. For allergies, Disp: 90 tablet, Rfl: 3  •  famotidine (PEPCID) 40 MG tablet, TK 1 T PO HS, Disp: , Rfl: .  he denies medication side effects.        /84   Pulse 64   Temp 97.3 °F (36.3 °C)   Resp 16   Ht 190.5 cm (75\")   Wt 124 kg (273 lb)   SpO2 97%   BMI 34.12 kg/m²     Results for orders placed or performed in visit on 07/08/20   Lipid Panel   Result Value Ref Range    Total Cholesterol 177 0 - 200 mg/dL    Triglycerides 200 (H) 0 - 150 mg/dL    HDL Cholesterol 41 40 - 60 mg/dL    VLDL Cholesterol 40 mg/dL    LDL " Cholesterol  96 0 - 100 mg/dL   Comprehensive Metabolic Panel   Result Value Ref Range    Glucose 105 (H) 65 - 99 mg/dL    BUN 16 6 - 20 mg/dL    Creatinine 1.20 0.76 - 1.27 mg/dL    eGFR Non African Am 63 >60 mL/min/1.73    eGFR African Am 77 >60 mL/min/1.73    BUN/Creatinine Ratio 13.3 7.0 - 25.0    Sodium 139 136 - 145 mmol/L    Potassium 4.6 3.5 - 5.2 mmol/L    Chloride 101 98 - 107 mmol/L    Total CO2 28.6 22.0 - 29.0 mmol/L    Calcium 9.6 8.6 - 10.5 mg/dL    Total Protein 7.0 6.0 - 8.5 g/dL    Albumin 4.90 3.50 - 5.20 g/dL    Globulin 2.1 gm/dL    A/G Ratio 2.3 g/dL    Total Bilirubin 0.6 0.0 - 1.2 mg/dL    Alkaline Phosphatase 56 39 - 117 U/L    AST (SGOT) 32 1 - 40 U/L    ALT (SGPT) 63 (H) 1 - 41 U/L   TSH   Result Value Ref Range    TSH 2.330 0.270 - 4.200 uIU/mL   PSA Screen   Result Value Ref Range    PSA 0.988 0.000 - 4.000 ng/mL   CBC & Differential   Result Value Ref Range    WBC 6.89 3.40 - 10.80 10*3/mm3    RBC 5.16 4.14 - 5.80 10*6/mm3    Hemoglobin 15.1 13.0 - 17.7 g/dL    Hematocrit 46.2 37.5 - 51.0 %    MCV 89.5 79.0 - 97.0 fL    MCH 29.3 26.6 - 33.0 pg    MCHC 32.7 31.5 - 35.7 g/dL    RDW 12.6 12.3 - 15.4 %    Platelets 204 140 - 450 10*3/mm3    Neutrophil Rel % 43.9 42.7 - 76.0 %    Lymphocyte Rel % 42.7 19.6 - 45.3 %    Monocyte Rel % 10.2 5.0 - 12.0 %    Eosinophil Rel % 2.2 0.3 - 6.2 %    Basophil Rel % 0.9 0.0 - 1.5 %    Neutrophils Absolute 3.03 1.70 - 7.00 10*3/mm3    Lymphocytes Absolute 2.94 0.70 - 3.10 10*3/mm3    Monocytes Absolute 0.70 0.10 - 0.90 10*3/mm3    Eosinophils Absolute 0.15 0.00 - 0.40 10*3/mm3    Basophils Absolute 0.06 0.00 - 0.20 10*3/mm3    Immature Granulocyte Rel % 0.1 0.0 - 0.5 %    Immature Grans Absolute 0.01 0.00 - 0.05 10*3/mm3    nRBC 0.0 0.0 - 0.2 /100 WBC       The following portions of the patient's history were reviewed and updated as appropriate: allergies, current medications, past family history, past medical history, past social history, past surgical  history and problem list.      he has a history of   Patient Active Problem List   Diagnosis   • Gastroesophageal reflux disease without esophagitis   • Stress   • Tension-type headache, not intractable       Review of Systems   Constitutional: Negative for activity change, appetite change and unexpected weight change.   Eyes: Negative for visual disturbance.   Respiratory: Negative for chest tightness and shortness of breath.    Cardiovascular: Negative for chest pain and palpitations.   Skin: Negative for color change.   Neurological: Negative for syncope and speech difficulty.   Psychiatric/Behavioral: Negative for confusion and decreased concentration.   All other systems reviewed and are negative.      Objective   Physical Exam   Constitutional: He is oriented to person, place, and time. He appears well-developed and well-nourished. No distress.   HENT:   Head: Atraumatic.   Mouth/Throat: Oropharynx is clear and moist.   Eyes: Pupils are equal, round, and reactive to light. EOM are normal.   Neck: Normal range of motion. Neck supple. No thyromegaly present.   Cardiovascular: Normal rate and regular rhythm.   Pulmonary/Chest: Effort normal and breath sounds normal. No respiratory distress.   Abdominal: Soft.   Musculoskeletal: Normal range of motion.   Neurological: He is alert and oriented to person, place, and time.   Skin: Skin is warm and dry. He is not diaphoretic.   Psychiatric: He has a normal mood and affect. His behavior is normal.   Nursing note and vitals reviewed.      Assessment/Plan   Edin was seen today for annual exam.    Diagnoses and all orders for this visit:    Annual physical exam  -     ECG 12 Lead    Other orders  -     busPIRone (BUSPAR) 5 MG tablet; Take 1 tablet by mouth 3 (Three) Times a Day.      Labs results discussed in detail with the patient, if no recent labs were done, order placed today.  Plan to update vaccines if needed today.  I  reviewed health maintenance with the  patient as part of my preventative care plan. I discussed preventative counseling with the patient in detail.

## 2020-08-27 DIAGNOSIS — R74.01 ELEVATED ALT MEASUREMENT: Primary | ICD-10-CM

## 2020-09-01 ENCOUNTER — RESULTS ENCOUNTER (OUTPATIENT)
Dept: FAMILY MEDICINE CLINIC | Facility: CLINIC | Age: 55
End: 2020-09-01

## 2020-09-01 DIAGNOSIS — Z00.00 ANNUAL PHYSICAL EXAM: ICD-10-CM

## 2020-09-30 ENCOUNTER — RESULTS ENCOUNTER (OUTPATIENT)
Dept: FAMILY MEDICINE CLINIC | Facility: CLINIC | Age: 55
End: 2020-09-30

## 2020-09-30 DIAGNOSIS — R74.01 ELEVATED ALT MEASUREMENT: ICD-10-CM

## 2021-03-15 ENCOUNTER — TELEPHONE (OUTPATIENT)
Dept: FAMILY MEDICINE CLINIC | Facility: CLINIC | Age: 56
End: 2021-03-15

## 2021-03-15 NOTE — TELEPHONE ENCOUNTER
Caller: Edin Flores    Relationship to patient: Self    Best call back number: [phone]    Concerns or Questions if Applicable: patient tested positive for covid on Saturday and was given some meds, he just wants to make sure that he is on the right meds and to touch base with his PCP.     Travel screen questions:

## 2021-03-16 RX ORDER — MONTELUKAST SODIUM 10 MG/1
10 TABLET ORAL
Qty: 90 TABLET | Refills: 3 | Status: SHIPPED | OUTPATIENT
Start: 2021-03-16 | End: 2022-11-09 | Stop reason: SDUPTHER

## 2021-03-24 ENCOUNTER — BULK ORDERING (OUTPATIENT)
Dept: CASE MANAGEMENT | Facility: OTHER | Age: 56
End: 2021-03-24

## 2021-03-24 DIAGNOSIS — Z23 IMMUNIZATION DUE: ICD-10-CM

## 2021-04-14 ENCOUNTER — CLINICAL SUPPORT (OUTPATIENT)
Dept: FAMILY MEDICINE CLINIC | Facility: CLINIC | Age: 56
End: 2021-04-14

## 2021-04-14 DIAGNOSIS — Z23 IMMUNIZATION DUE: Primary | ICD-10-CM

## 2021-04-14 PROCEDURE — 90715 TDAP VACCINE 7 YRS/> IM: CPT | Performed by: FAMILY MEDICINE

## 2021-04-14 PROCEDURE — 90471 IMMUNIZATION ADMIN: CPT | Performed by: FAMILY MEDICINE

## 2021-04-14 NOTE — PROGRESS NOTES
Immunization  Immunization performed in LD by Alejo Vásquez MA. Patient tolerated the procedure well without complications.  04/14/21   Alejo Vásquez MA

## 2022-03-08 ENCOUNTER — OFFICE (OUTPATIENT)
Dept: URBAN - METROPOLITAN AREA CLINIC 75 | Facility: CLINIC | Age: 57
End: 2022-03-08

## 2022-03-08 VITALS
OXYGEN SATURATION: 97 % | WEIGHT: 315 LBS | SYSTOLIC BLOOD PRESSURE: 144 MMHG | DIASTOLIC BLOOD PRESSURE: 62 MMHG | TEMPERATURE: 97.7 F | HEART RATE: 87 BPM | HEIGHT: 75 IN

## 2022-03-08 DIAGNOSIS — K30 FUNCTIONAL DYSPEPSIA: ICD-10-CM

## 2022-03-08 DIAGNOSIS — K21.9 GASTRO-ESOPHAGEAL REFLUX DISEASE WITHOUT ESOPHAGITIS: ICD-10-CM

## 2022-03-08 DIAGNOSIS — Z12.11 ENCOUNTER FOR SCREENING FOR MALIGNANT NEOPLASM OF COLON: ICD-10-CM

## 2022-03-08 PROCEDURE — 99214 OFFICE O/P EST MOD 30 MIN: CPT | Performed by: NURSE PRACTITIONER

## 2022-05-24 ENCOUNTER — OFFICE VISIT (OUTPATIENT)
Dept: FAMILY MEDICINE CLINIC | Facility: CLINIC | Age: 57
End: 2022-05-24

## 2022-05-24 VITALS
HEART RATE: 75 BPM | WEIGHT: 233 LBS | RESPIRATION RATE: 16 BRPM | TEMPERATURE: 97.4 F | DIASTOLIC BLOOD PRESSURE: 88 MMHG | SYSTOLIC BLOOD PRESSURE: 148 MMHG | OXYGEN SATURATION: 97 % | HEIGHT: 75 IN | BODY MASS INDEX: 28.97 KG/M2

## 2022-05-24 DIAGNOSIS — R09.81 SINUS CONGESTION: ICD-10-CM

## 2022-05-24 DIAGNOSIS — J01.00 ACUTE MAXILLARY SINUSITIS, RECURRENCE NOT SPECIFIED: Primary | ICD-10-CM

## 2022-05-24 PROCEDURE — 99213 OFFICE O/P EST LOW 20 MIN: CPT

## 2022-05-24 RX ORDER — AMOXICILLIN AND CLAVULANATE POTASSIUM 875; 125 MG/1; MG/1
1 TABLET, FILM COATED ORAL EVERY 12 HOURS SCHEDULED
Qty: 14 TABLET | Refills: 0 | Status: SHIPPED | OUTPATIENT
Start: 2022-05-24 | End: 2022-06-01

## 2022-05-24 NOTE — PROGRESS NOTES
"Chief Complaint  Sinus Congestion    Subjective          Edin Flores presents to [facility]    History of Present Illness    Edin Flores 57 y.o. male who presents for evaluation of sinus pressure and congestion. Symptoms include congestion, sneezing, post nasal drip, sinus pain, runny nose and green nasal discharge.  Onset of symptoms was 4 days ago, gradually worsening since that time. Patient denies shortness of breath, wheezing, hemoptysis, pleuritic chest pain, fever, dyspnea on exertion, history of asthma, ear drainage, eye discharge, diarrhea, vomiting, vertigo, chills, sweats, epistaxis, high fever, and joint pain.   Evaluation to date: none Treatment to date:  Mucinex and Singulair.     No known sick contacts and no known Covid exposure.    He  denies medication side effects.    The following data was reviewed by: HAWK Bentley on 05/24/2022:    Objective     Vital Signs:   /88   Pulse 75   Temp 97.4 °F (36.3 °C)   Resp 16   Ht 190.5 cm (75\")   Wt 106 kg (233 lb)   SpO2 97%   BMI 29.12 kg/m²      Review of Systems   Constitutional: Negative for chills, diaphoresis, fatigue and fever.   HENT: Positive for congestion (sinus), postnasal drip, rhinorrhea, sinus pressure and sneezing. Negative for dental problem, drooling, ear discharge, ear pain, facial swelling, hearing loss, mouth sores, nosebleeds, sore throat, swollen glands, tinnitus, trouble swallowing and voice change.    Eyes: Negative for visual disturbance.   Respiratory: Negative for apnea, cough, chest tightness, shortness of breath and wheezing.    Cardiovascular: Negative for chest pain and palpitations.   Gastrointestinal: Negative for nausea and vomiting.   Endocrine: Negative for cold intolerance, heat intolerance, polydipsia, polyphagia and polyuria.   Genitourinary: Negative for dysuria.   Musculoskeletal: Negative for back pain.   Skin: Negative for rash.   Neurological: Negative for " dizziness, weakness, light-headedness, numbness and headache.   Psychiatric/Behavioral: Negative for dysphoric mood and sleep disturbance.         Physical Exam  Vitals and nursing note reviewed.   Constitutional:       General: He is not in acute distress.     Appearance: Normal appearance. He is well-developed. He is not toxic-appearing or diaphoretic.   HENT:      Head: Normocephalic and atraumatic. Hair is normal.      Right Ear: External ear normal. No drainage, swelling or tenderness. Tympanic membrane is not retracted.      Left Ear: External ear normal. No drainage, swelling or tenderness. Tympanic membrane is not retracted.      Nose: Congestion and rhinorrhea present. No mucosal edema.      Right Sinus: Maxillary sinus tenderness present. No frontal sinus tenderness.      Left Sinus: Maxillary sinus tenderness present. No frontal sinus tenderness.      Mouth/Throat:      Mouth: Mucous membranes are moist. No oral lesions.      Pharynx: Uvula midline. Posterior oropharyngeal erythema present. No pharyngeal swelling, oropharyngeal exudate or uvula swelling.      Tonsils: No tonsillar exudate.   Eyes:      General: No scleral icterus.        Right eye: No discharge.         Left eye: No discharge.      Conjunctiva/sclera: Conjunctivae normal.      Pupils: Pupils are equal, round, and reactive to light.   Cardiovascular:      Rate and Rhythm: Normal rate and regular rhythm.      Pulses: Normal pulses.      Heart sounds: Normal heart sounds. No murmur heard.    No gallop.   Pulmonary:      Effort: No tachypnea, bradypnea, accessory muscle usage, prolonged expiration or respiratory distress.      Breath sounds: Normal breath sounds. No stridor or decreased air movement. No decreased breath sounds, wheezing, rhonchi or rales.   Chest:      Chest wall: No tenderness.   Abdominal:      Palpations: Abdomen is soft.      Tenderness: There is no abdominal tenderness.   Musculoskeletal:      Cervical back: Normal range  of motion and neck supple.   Lymphadenopathy:      Cervical: No cervical adenopathy.   Skin:     General: Skin is warm and dry.      Findings: No rash.   Neurological:      Mental Status: He is alert and oriented to person, place, and time.      Motor: No abnormal muscle tone.   Psychiatric:         Behavior: Behavior normal.         Thought Content: Thought content normal.         Judgment: Judgment normal.                     Assessment and Plan      Diagnoses and all orders for this visit:    1. Acute maxillary sinusitis, recurrence not specified (Primary)  -     amoxicillin-clavulanate (Augmentin) 875-125 MG per tablet; Take 1 tablet by mouth Every 12 (Twelve) Hours for 7 days.  Dispense: 14 tablet; Refill: 0    2. Sinus congestion  Comments:  Continue Nasacort, Ally pot  Orders:  -     amoxicillin-clavulanate (Augmentin) 875-125 MG per tablet; Take 1 tablet by mouth Every 12 (Twelve) Hours for 7 days.  Dispense: 14 tablet; Refill: 0            Follow Up     Return if symptoms worsen or fail to improve.    Patient was given instructions and counseling regarding his condition or for health maintenance advice. Please see specific information pulled into the AVS if appropriate.     -Take medication as prescribed.  -Monitor for fever and take Tylenol as needed.  Drink plenty of fluids and get plenty of rest.  -Use cool-mist humidifier as needed.  -Seek immediate medical attention for fever unrelieved by Tylenol, chest pain, shortness of breath, decreased oxygen saturations, sharp back pain, or any other worsening signs or symptoms.  -The patient verbalized understanding of all instructions given today.

## 2022-05-25 ENCOUNTER — TELEPHONE (OUTPATIENT)
Dept: FAMILY MEDICINE CLINIC | Facility: CLINIC | Age: 57
End: 2022-05-25

## 2022-05-25 DIAGNOSIS — J01.00 ACUTE MAXILLARY SINUSITIS, RECURRENCE NOT SPECIFIED: Primary | ICD-10-CM

## 2022-05-25 RX ORDER — PREDNISONE 20 MG/1
TABLET ORAL
Qty: 10 TABLET | Refills: 0 | Status: SHIPPED | OUTPATIENT
Start: 2022-05-25 | End: 2022-06-01

## 2022-05-25 NOTE — TELEPHONE ENCOUNTER
Caller: Edin Flores    Relationship: Self    Best call back number: [phone]    What medication are you requesting: PREDNISONE    What are your current symptoms: COUGH AND SINUS CONGESTION    Have you had these symptoms before:    [x] Yes  [] No    Have you been treated for these symptoms before:   [x] Yes  [] No    If a prescription is needed, what is your preferred pharmacy and phone number: [id number] [address] [facility] - [phone] [facility] [phone]     Additional notes: PATIENT STATES HE HAD AN APPOINTMENT WITH [facility]U [facility] YESTERDAY, 5/25/22, AND WAS DIAGNOSED WITH A SINUS INFECTION. HE HAS SINCE DEVELOPED A COUGH. HE STATES THAT DR DE GUZMAN HAD PRESCRIBED PREDNISONE FOR HIM IN THE PAST TO HELP TREAT SYMPTOMS RELATED TO SINUS INFECTIONS, AND REQUESTS A NEW PRESCRIPTION. PLEASE CALL WITH ANY ADDITIONAL QUESTIONS OR CONCERNS.

## 2022-06-01 ENCOUNTER — TELEPHONE (OUTPATIENT)
Dept: FAMILY MEDICINE CLINIC | Facility: CLINIC | Age: 57
End: 2022-06-01

## 2022-06-01 ENCOUNTER — OFFICE VISIT (OUTPATIENT)
Dept: FAMILY MEDICINE CLINIC | Facility: CLINIC | Age: 57
End: 2022-06-01

## 2022-06-01 VITALS
BODY MASS INDEX: 28.97 KG/M2 | HEART RATE: 78 BPM | TEMPERATURE: 98 F | DIASTOLIC BLOOD PRESSURE: 79 MMHG | HEIGHT: 75 IN | RESPIRATION RATE: 16 BRPM | SYSTOLIC BLOOD PRESSURE: 134 MMHG | OXYGEN SATURATION: 96 % | WEIGHT: 233 LBS

## 2022-06-01 DIAGNOSIS — R05.8 PRODUCTIVE COUGH: ICD-10-CM

## 2022-06-01 DIAGNOSIS — J01.00 ACUTE MAXILLARY SINUSITIS, RECURRENCE NOT SPECIFIED: Primary | ICD-10-CM

## 2022-06-01 PROCEDURE — 99213 OFFICE O/P EST LOW 20 MIN: CPT

## 2022-06-01 RX ORDER — CEFDINIR 300 MG/1
300 CAPSULE ORAL 2 TIMES DAILY
Qty: 20 CAPSULE | Refills: 0 | Status: SHIPPED | OUTPATIENT
Start: 2022-06-01 | End: 2022-06-11

## 2022-06-01 RX ORDER — FEXOFENADINE HCL 180 MG/1
180 TABLET ORAL DAILY
COMMUNITY

## 2022-06-01 RX ORDER — BENZONATATE 100 MG/1
100 CAPSULE ORAL 3 TIMES DAILY PRN
Qty: 30 CAPSULE | Refills: 1 | Status: SHIPPED | OUTPATIENT
Start: 2022-06-01 | End: 2022-11-09

## 2022-06-01 RX ORDER — GUAIFENESIN 600 MG/1
1200 TABLET, EXTENDED RELEASE ORAL 2 TIMES DAILY
COMMUNITY

## 2022-06-01 RX ORDER — PREDNISONE 20 MG/1
20 TABLET ORAL 2 TIMES DAILY
Qty: 10 TABLET | Refills: 0 | Status: SHIPPED | OUTPATIENT
Start: 2022-06-01 | End: 2022-06-06

## 2022-06-01 NOTE — PROGRESS NOTES
"Chief Complaint  Cough (Pt states symptoms have gotten better but he still feel bad.  /), Nasal Congestion, and sneezing    Subjective          Edin Flores presents to [facility]    History of Present Illness    Edin Flores 57 y.o. male who presents today for follow-up on acute maxillary sinusitis and sinus congestion.  The patient was last seen on 05/24/2022.  He was prescribed Amoxicillin-Clavulanate (Augmentin) 875-125 mg twice daily for 7 days.  He was instructed to continue Nasacort, Ally pot, Tylenol as needed, drink plenty of fluids and get plenty of rest, use cool-mist humidifier, and to return if no improvement.  The patient called back the next day reporting that he had developed a cough and was requesting Prednisone.  A prescription for Prednisone 20 mg twice daily for 5 days was sent to the patient's pharmacy by HAWK Negrete.     Today, the patient reports ongoing cough, nasal congestion, clear-to green nasal nasal drainage, sinus pressure, and sneezing.  He completed the Augmentin and prednisone.  He states the medications helped to improve his symptoms somewhat, but he is still experiencing some symptoms.    He  denies medication side effects.        Objective     Vital Signs:   /79   Pulse 78   Temp 98 °F (36.7 °C) (Skin)   Resp 16   Ht 190.5 cm (75\")   Wt 106 kg (233 lb)   SpO2 96%   BMI 29.12 kg/m²      Review of Systems   Constitutional: Negative for chills, diaphoresis, fatigue and fever.   HENT: Positive for congestion (nasal), rhinorrhea (clear to green), sinus pressure and sneezing. Negative for dental problem, drooling, ear discharge, ear pain, facial swelling, hearing loss, mouth sores, nosebleeds, postnasal drip, sore throat, swollen glands, tinnitus, trouble swallowing and voice change.    Eyes: Negative for visual disturbance.   Respiratory: Positive for cough (productive, white sputum). Negative for apnea, chest tightness, " shortness of breath and wheezing.    Cardiovascular: Negative for chest pain and palpitations.   Gastrointestinal: Negative for nausea and vomiting.   Endocrine: Negative for cold intolerance, heat intolerance, polydipsia, polyphagia and polyuria.   Genitourinary: Negative for dysuria.   Musculoskeletal: Negative for back pain.   Skin: Negative for rash.   Neurological: Negative for dizziness, weakness, light-headedness, numbness and headache.   Psychiatric/Behavioral: Negative for dysphoric mood and sleep disturbance.         Physical Exam  Vitals and nursing note reviewed.   Constitutional:       General: He is not in acute distress.     Appearance: Normal appearance. He is well-developed. He is not toxic-appearing or diaphoretic.   HENT:      Head: Normocephalic and atraumatic. Hair is normal.      Right Ear: External ear normal. No drainage, swelling or tenderness. Tympanic membrane is not retracted.      Left Ear: External ear normal. No drainage, swelling or tenderness. Tympanic membrane is not retracted.      Nose: Congestion and rhinorrhea present. No mucosal edema.      Right Sinus: Maxillary sinus tenderness present. No frontal sinus tenderness.      Left Sinus: Maxillary sinus tenderness present. No frontal sinus tenderness.      Mouth/Throat:      Mouth: Mucous membranes are moist. No oral lesions.      Pharynx: Uvula midline. Posterior oropharyngeal erythema present. No pharyngeal swelling, oropharyngeal exudate or uvula swelling.      Tonsils: No tonsillar exudate.   Eyes:      General: No scleral icterus.        Right eye: No discharge.         Left eye: No discharge.      Conjunctiva/sclera: Conjunctivae normal.      Pupils: Pupils are equal, round, and reactive to light.   Cardiovascular:      Rate and Rhythm: Normal rate and regular rhythm.      Pulses: Normal pulses.      Heart sounds: Normal heart sounds. No murmur heard.    No gallop.   Pulmonary:      Effort: No tachypnea, bradypnea, accessory  muscle usage, prolonged expiration or respiratory distress.      Breath sounds: Normal breath sounds. No stridor or decreased air movement. No decreased breath sounds, wheezing, rhonchi or rales.   Chest:      Chest wall: No tenderness.   Abdominal:      Palpations: Abdomen is soft.      Tenderness: There is no abdominal tenderness.   Musculoskeletal:      Cervical back: Normal range of motion and neck supple.   Lymphadenopathy:      Cervical: No cervical adenopathy.   Skin:     General: Skin is warm and dry.      Findings: No rash.   Neurological:      Mental Status: He is alert and oriented to person, place, and time.      Motor: No abnormal muscle tone.   Psychiatric:         Behavior: Behavior normal.         Thought Content: Thought content normal.         Judgment: Judgment normal.                     Assessment and Plan      Diagnoses and all orders for this visit:    1. Acute maxillary sinusitis, recurrence not specified (Primary)  -     cefdinir (OMNICEF) 300 MG capsule; Take 1 capsule by mouth 2 (Two) Times a Day for 10 days.  Dispense: 20 capsule; Refill: 0  -     predniSONE (DELTASONE) 20 MG tablet; Take 1 tablet by mouth 2 (Two) Times a Day for 5 days.  Dispense: 10 tablet; Refill: 0  -     benzonatate (Tessalon Perles) 100 MG capsule; Take 1 capsule by mouth 3 (Three) Times a Day As Needed for Cough.  Dispense: 30 capsule; Refill: 1    2. Productive cough  -     cefdinir (OMNICEF) 300 MG capsule; Take 1 capsule by mouth 2 (Two) Times a Day for 10 days.  Dispense: 20 capsule; Refill: 0  -     predniSONE (DELTASONE) 20 MG tablet; Take 1 tablet by mouth 2 (Two) Times a Day for 5 days.  Dispense: 10 tablet; Refill: 0  -     benzonatate (Tessalon Perles) 100 MG capsule; Take 1 capsule by mouth 3 (Three) Times a Day As Needed for Cough.  Dispense: 30 capsule; Refill: 1            Follow Up     Return if symptoms worsen or fail to improve.    Patient was given instructions and counseling regarding his  condition or for health maintenance advice. Please see specific information pulled into the AVS if appropriate.     -Take medication as prescribed.  Start a daily probiotic.  Monitor blood pressure, blood glucose, and drink plenty of water while taking Prednisone.  -Monitor for fever and take Tylenol as needed.  Drink plenty of fluids and get plenty of rest.  -Use cool-mist humidifier as needed.  -Seek immediate medical attention for fever unrelieved by Tylenol, chest pain, shortness of breath, decreased oxygen saturations, sharp back pain, or any other worsening signs or symptoms.  -The patient verbalized understanding of all instructions given today.

## 2022-06-01 NOTE — TELEPHONE ENCOUNTER
Caller: Edin Flores    Relationship: Self    Best call back number: [phone]    Who are you requesting to speak with (clinical staff, provider,  specific staff member): HAWK ABREU [facility]    What was the call regarding: PATIENT STATES THAT HE HAS STILL BEEN FEELING ILL FOLLOWING THE MEDICATION HE WAS PRESCRIBED TO TREAT HIS SINUS INFECTION. HE HAS BEEN EXPERIENCING SYMPTOMS FOR ABOUT 2 WEEKS NOW. HE STATES HE WAS ALREADY PRESCRIBED AN ANTIBIOTIC AND PREDNISONE AS TREATMENT, BUT IS STILL SICK. REQUESTS A CALL BACK TO DISCUSS FURTHER TREATMENT OPTIONS    Do you require a callback: YES

## 2022-11-09 ENCOUNTER — OFFICE VISIT (OUTPATIENT)
Dept: FAMILY MEDICINE CLINIC | Facility: CLINIC | Age: 57
End: 2022-11-09

## 2022-11-09 VITALS
RESPIRATION RATE: 16 BRPM | OXYGEN SATURATION: 98 % | WEIGHT: 233.4 LBS | SYSTOLIC BLOOD PRESSURE: 135 MMHG | HEIGHT: 75 IN | BODY MASS INDEX: 29.02 KG/M2 | TEMPERATURE: 97.5 F | DIASTOLIC BLOOD PRESSURE: 80 MMHG | HEART RATE: 85 BPM

## 2022-11-09 DIAGNOSIS — J01.90 ACUTE SINUSITIS, RECURRENCE NOT SPECIFIED, UNSPECIFIED LOCATION: Primary | ICD-10-CM

## 2022-11-09 DIAGNOSIS — Z12.5 SCREENING PSA (PROSTATE SPECIFIC ANTIGEN): ICD-10-CM

## 2022-11-09 DIAGNOSIS — Z00.00 LABORATORY EXAMINATION ORDERED AS PART OF A ROUTINE GENERAL MEDICAL EXAMINATION: ICD-10-CM

## 2022-11-09 DIAGNOSIS — Z12.11 SCREEN FOR COLON CANCER: ICD-10-CM

## 2022-11-09 DIAGNOSIS — J30.9 CHRONIC ALLERGIC RHINITIS: ICD-10-CM

## 2022-11-09 PROCEDURE — 99213 OFFICE O/P EST LOW 20 MIN: CPT | Performed by: PHYSICIAN ASSISTANT

## 2022-11-09 RX ORDER — PREDNISONE 20 MG/1
20 TABLET ORAL 2 TIMES DAILY
Qty: 10 TABLET | Refills: 0 | Status: SHIPPED | OUTPATIENT
Start: 2022-11-09

## 2022-11-09 RX ORDER — BENZONATATE 200 MG/1
200 CAPSULE ORAL 3 TIMES DAILY PRN
Qty: 30 CAPSULE | Refills: 2 | Status: SHIPPED | OUTPATIENT
Start: 2022-11-09

## 2022-11-09 RX ORDER — AMOXICILLIN AND CLAVULANATE POTASSIUM 875; 125 MG/1; MG/1
1 TABLET, FILM COATED ORAL EVERY 12 HOURS SCHEDULED
Qty: 20 TABLET | Refills: 2 | Status: SHIPPED | OUTPATIENT
Start: 2022-11-09

## 2022-11-09 RX ORDER — MONTELUKAST SODIUM 10 MG/1
10 TABLET ORAL
Qty: 90 TABLET | Refills: 3 | Status: SHIPPED | OUTPATIENT
Start: 2022-11-09

## 2022-11-09 NOTE — PROGRESS NOTES
Subjective   Edin Flores is a 57 y.o. male.     History of Present Illness   Edin Flores 57 y.o. male who presents for re-evaluation of sinus pressure and congestion. Symptoms include congestion, post nasal drip, productive cough, cough described as productive of white sputum and fatigue.  Onset of symptoms was 2 weeks ago, unchanged since that time. Patient denies shortness of breath, wheezing, fever.   Evaluation to date: was seen at the [facility] Treatment to date:  antibiotics from prior visit.   Still cough; pnd, sore throat,   abd sore from cough  Was worse first week onset  Sinus pan; no wheezing or SOA; chest no longer tight  No fever  No diarrhea  Went 2 weeks ago. Went to  1 week ago.  Rx Zpak, did medrol kesha  No cough Rx    The following portions of the patient's history were reviewed and updated as appropriate: allergies, current medications, past family history, past medical history, past social history, past surgical history and problem list.    Review of Systems   Constitutional: Positive for fatigue. Negative for fever.   HENT: Positive for congestion, postnasal drip and sinus pressure. Negative for nosebleeds and trouble swallowing.    Eyes: Negative for visual disturbance.   Respiratory: Positive for cough. Negative for choking and stridor.    Cardiovascular: Negative for chest pain.   Gastrointestinal: Negative for blood in stool.   Endocrine: Negative for polydipsia.   Genitourinary: Negative for genital sores and hematuria.   Musculoskeletal: Negative for joint swelling.   Skin: Negative for color change and rash.   Allergic/Immunologic: Negative for immunocompromised state.   Neurological: Negative for seizures, facial asymmetry and speech difficulty.   Hematological: Negative for adenopathy.   Psychiatric/Behavioral: Negative for behavioral problems, self-injury and suicidal ideas.       Objective   Physical Exam  Vitals and nursing note reviewed.   Constitutional:       General:  He is not in acute distress.     Appearance: He is well-developed. He is not toxic-appearing or diaphoretic.   HENT:      Head: Normocephalic and atraumatic. Hair is normal.      Right Ear: External ear normal. No drainage, swelling or tenderness. Tympanic membrane is retracted.      Left Ear: External ear normal. No drainage, swelling or tenderness. Tympanic membrane is retracted.      Nose: Mucosal edema present.      Mouth/Throat:      Mouth: No oral lesions.      Pharynx: Uvula midline. Posterior oropharyngeal erythema present. No oropharyngeal exudate or uvula swelling.   Eyes:      General: No scleral icterus.        Right eye: No discharge.         Left eye: No discharge.      Conjunctiva/sclera: Conjunctivae normal.      Pupils: Pupils are equal, round, and reactive to light.   Cardiovascular:      Rate and Rhythm: Normal rate and regular rhythm.      Heart sounds: Normal heart sounds. No murmur heard.    No gallop.   Pulmonary:      Effort: No respiratory distress.      Breath sounds: Normal breath sounds. No stridor. No wheezing or rales.   Chest:      Chest wall: No tenderness.   Abdominal:      Palpations: Abdomen is soft.      Tenderness: There is no abdominal tenderness.   Musculoskeletal:      Cervical back: Normal range of motion and neck supple.   Lymphadenopathy:      Cervical: Cervical adenopathy present.   Skin:     General: Skin is warm and dry.      Findings: No rash.   Neurological:      Mental Status: He is alert and oriented to person, place, and time.      Motor: No abnormal muscle tone.   Psychiatric:         Behavior: Behavior normal.         Thought Content: Thought content normal.         Judgment: Judgment normal.         Assessment & Plan   Diagnoses and all orders for this visit:    1. Acute sinusitis, recurrence not specified, unspecified location (Primary)    2. Chronic allergic rhinitis  Comments:  Singulair works seasonal    3. Screening PSA (prostate specific antigen)  -      Comprehensive metabolic panel  -     Lipid panel  -     CBC and Differential  -     TSH  -     PSA Screen  -     Urinalysis With Microscopic - Urine, Clean Catch    4. Laboratory examination ordered as part of a routine general medical examination  -     Comprehensive metabolic panel  -     Lipid panel  -     CBC and Differential  -     TSH  -     PSA Screen  -     Urinalysis With Microscopic - Urine, Clean Catch    Other orders  -     amoxicillin-clavulanate (Augmentin) 875-125 MG per tablet; Take 1 tablet by mouth Every 12 (Twelve) Hours. One PO BID for infection with food  Dispense: 20 tablet; Refill: 2  -     predniSONE (DELTASONE) 20 MG tablet; Take 1 tablet by mouth 2 (Two) Times a Day. With food for 5 days  Dispense: 10 tablet; Refill: 0  -     benzonatate (TESSALON) 200 MG capsule; Take 1 capsule by mouth 3 (Three) Times a Day As Needed for Cough.  Dispense: 30 capsule; Refill: 2  -     montelukast (SINGULAIR) 10 MG tablet; Take 1 tablet by mouth every night at bedtime. For allergies  Dispense: 90 tablet; Refill: 3        Ok Singulair for allergies  Update labs for prevention  Augmentin for infx--sinus and pred for inflammation  Tessalon for cough  Screen for colon cancer

## 2022-11-17 LAB
ALBUMIN SERPL-MCNC: 4.1 G/DL (ref 3.5–5.2)
ALBUMIN/GLOB SERPL: 1.6 G/DL
ALP SERPL-CCNC: 61 U/L (ref 39–117)
ALT SERPL-CCNC: 45 U/L (ref 1–41)
APPEARANCE UR: CLEAR
AST SERPL-CCNC: 27 U/L (ref 1–40)
BACTERIA #/AREA URNS HPF: NORMAL /HPF
BASOPHILS # BLD AUTO: 0.04 10*3/MM3 (ref 0–0.2)
BASOPHILS NFR BLD AUTO: 0.7 % (ref 0–1.5)
BILIRUB SERPL-MCNC: 0.9 MG/DL (ref 0–1.2)
BILIRUB UR QL STRIP: NEGATIVE
BUN SERPL-MCNC: 14 MG/DL (ref 6–20)
BUN/CREAT SERPL: 13.2 (ref 7–25)
CALCIUM SERPL-MCNC: 9.6 MG/DL (ref 8.6–10.5)
CASTS URNS MICRO: NORMAL
CHLORIDE SERPL-SCNC: 104 MMOL/L (ref 98–107)
CHOLEST SERPL-MCNC: 192 MG/DL (ref 0–200)
CO2 SERPL-SCNC: 23.9 MMOL/L (ref 22–29)
COLOR UR: YELLOW
CREAT SERPL-MCNC: 1.06 MG/DL (ref 0.76–1.27)
EGFRCR SERPLBLD CKD-EPI 2021: 82.9 ML/MIN/1.73
EOSINOPHIL # BLD AUTO: 0.12 10*3/MM3 (ref 0–0.4)
EOSINOPHIL NFR BLD AUTO: 2 % (ref 0.3–6.2)
EPI CELLS #/AREA URNS HPF: NORMAL /HPF
ERYTHROCYTE [DISTWIDTH] IN BLOOD BY AUTOMATED COUNT: 12.1 % (ref 12.3–15.4)
GLOBULIN SER CALC-MCNC: 2.5 GM/DL
GLUCOSE SERPL-MCNC: 129 MG/DL (ref 65–99)
GLUCOSE UR QL STRIP: NEGATIVE
HCT VFR BLD AUTO: 44.1 % (ref 37.5–51)
HDLC SERPL-MCNC: 52 MG/DL (ref 40–60)
HGB BLD-MCNC: 14.9 G/DL (ref 13–17.7)
HGB UR QL STRIP: NEGATIVE
IMM GRANULOCYTES # BLD AUTO: 0.02 10*3/MM3 (ref 0–0.05)
IMM GRANULOCYTES NFR BLD AUTO: 0.3 % (ref 0–0.5)
KETONES UR QL STRIP: NEGATIVE
LDLC SERPL CALC-MCNC: 113 MG/DL (ref 0–100)
LEUKOCYTE ESTERASE UR QL STRIP: ABNORMAL
LYMPHOCYTES # BLD AUTO: 2.57 10*3/MM3 (ref 0.7–3.1)
LYMPHOCYTES NFR BLD AUTO: 43.3 % (ref 19.6–45.3)
MCH RBC QN AUTO: 28.7 PG (ref 26.6–33)
MCHC RBC AUTO-ENTMCNC: 33.8 G/DL (ref 31.5–35.7)
MCV RBC AUTO: 85 FL (ref 79–97)
MONOCYTES # BLD AUTO: 0.59 10*3/MM3 (ref 0.1–0.9)
MONOCYTES NFR BLD AUTO: 9.9 % (ref 5–12)
NEUTROPHILS # BLD AUTO: 2.59 10*3/MM3 (ref 1.7–7)
NEUTROPHILS NFR BLD AUTO: 43.8 % (ref 42.7–76)
NITRITE UR QL STRIP: NEGATIVE
NRBC BLD AUTO-RTO: 0 /100 WBC (ref 0–0.2)
PH UR STRIP: 7.5 [PH] (ref 5–8)
PLATELET # BLD AUTO: 227 10*3/MM3 (ref 140–450)
POTASSIUM SERPL-SCNC: 4.4 MMOL/L (ref 3.5–5.2)
PROT SERPL-MCNC: 6.6 G/DL (ref 6–8.5)
PROT UR QL STRIP: NEGATIVE
PSA SERPL-MCNC: 1.3 NG/ML (ref 0–4)
RBC # BLD AUTO: 5.19 10*6/MM3 (ref 4.14–5.8)
RBC #/AREA URNS HPF: NORMAL /HPF
SODIUM SERPL-SCNC: 140 MMOL/L (ref 136–145)
SP GR UR STRIP: 1.02 (ref 1–1.03)
TRIGL SERPL-MCNC: 155 MG/DL (ref 0–150)
TSH SERPL DL<=0.005 MIU/L-ACNC: 2.6 UIU/ML (ref 0.27–4.2)
UROBILINOGEN UR STRIP-MCNC: ABNORMAL MG/DL
VLDLC SERPL CALC-MCNC: 27 MG/DL (ref 5–40)
WBC # BLD AUTO: 5.93 10*3/MM3 (ref 3.4–10.8)
WBC #/AREA URNS HPF: NORMAL /HPF

## 2024-03-13 ENCOUNTER — OFFICE (OUTPATIENT)
Dept: URBAN - METROPOLITAN AREA CLINIC 76 | Facility: CLINIC | Age: 59
End: 2024-03-13
Payer: COMMERCIAL

## 2024-03-13 VITALS
OXYGEN SATURATION: 98 % | HEART RATE: 66 BPM | HEIGHT: 75 IN | SYSTOLIC BLOOD PRESSURE: 145 MMHG | WEIGHT: 236 LBS | DIASTOLIC BLOOD PRESSURE: 98 MMHG | SYSTOLIC BLOOD PRESSURE: 137 MMHG | DIASTOLIC BLOOD PRESSURE: 90 MMHG

## 2024-03-13 DIAGNOSIS — R10.13 EPIGASTRIC PAIN: ICD-10-CM

## 2024-03-13 DIAGNOSIS — R07.89 OTHER CHEST PAIN: ICD-10-CM

## 2024-03-13 DIAGNOSIS — K21.9 GASTRO-ESOPHAGEAL REFLUX DISEASE WITHOUT ESOPHAGITIS: ICD-10-CM

## 2024-03-13 PROCEDURE — 99213 OFFICE O/P EST LOW 20 MIN: CPT | Performed by: INTERNAL MEDICINE

## 2024-03-13 RX ORDER — DEXLANSOPRAZOLE 60 MG/1
60 CAPSULE, DELAYED RELEASE ORAL
Qty: 90 | Refills: 3 | Status: ACTIVE

## 2024-03-13 NOTE — SERVICEHPINOTES
long-standing is here for follow-up and continues to do well on Dexilanr.  If he misses a dose symptoms relapse.  He rarely has to supplement with over-the-counter antacids.  There is no dysphagia, odynophagia nausea vomiting melena or hematemesis.  He has not had an upper endoscopy.  He declines at this time.
br
sohan He is stable from a lower GI standpoint.  The last time he saw my nurse practitioner she talked to him about screening colonoscopy.  Since that time he did a cold Alban, he says he hasn't heard anything about the results but he says due to cost he cited to do a colon Alban.  If it's positive obviously he'll need colonoscopy.
br
sohan He is in no acute distress.  There is no change in his past medical or past surgical history.  He says he is feeling better because he is more active with his new job.

## 2024-05-22 RX ORDER — MONTELUKAST SODIUM 10 MG/1
10 TABLET ORAL
Qty: 30 TABLET | Refills: 0 | Status: SHIPPED | OUTPATIENT
Start: 2024-05-22